# Patient Record
Sex: FEMALE | Race: ASIAN | NOT HISPANIC OR LATINO | URBAN - METROPOLITAN AREA
[De-identification: names, ages, dates, MRNs, and addresses within clinical notes are randomized per-mention and may not be internally consistent; named-entity substitution may affect disease eponyms.]

---

## 2020-12-01 ENCOUNTER — INPATIENT (INPATIENT)
Facility: HOSPITAL | Age: 85
LOS: 2 days | Discharge: HOME | DRG: 871 | End: 2020-12-04
Attending: INTERNAL MEDICINE | Admitting: INTERNAL MEDICINE
Payer: MEDICAID

## 2020-12-01 VITALS — RESPIRATION RATE: 33 BRPM | OXYGEN SATURATION: 97 % | HEART RATE: 115 BPM

## 2020-12-01 DIAGNOSIS — J18.9 PNEUMONIA, UNSPECIFIED ORGANISM: ICD-10-CM

## 2020-12-01 LAB
ALBUMIN SERPL ELPH-MCNC: 2.3 G/DL — LOW (ref 3.5–5)
ALP SERPL-CCNC: 123 U/L — HIGH (ref 40–120)
ALT FLD-CCNC: 35 U/L DA — SIGNIFICANT CHANGE UP (ref 10–60)
ANION GAP SERPL CALC-SCNC: 5 MMOL/L — SIGNIFICANT CHANGE UP (ref 5–17)
APPEARANCE UR: CLEAR — SIGNIFICANT CHANGE UP
APTT BLD: 33.8 SEC — SIGNIFICANT CHANGE UP (ref 27.5–35.5)
AST SERPL-CCNC: 42 U/L — HIGH (ref 10–40)
BASE EXCESS BLDV CALC-SCNC: 1 MMOL/L — SIGNIFICANT CHANGE UP (ref -2–2)
BASOPHILS # BLD AUTO: 0 K/UL — SIGNIFICANT CHANGE UP (ref 0–0.2)
BASOPHILS NFR BLD AUTO: 0 % — SIGNIFICANT CHANGE UP (ref 0–2)
BILIRUB SERPL-MCNC: 0.6 MG/DL — SIGNIFICANT CHANGE UP (ref 0.2–1.2)
BILIRUB UR-MCNC: NEGATIVE — SIGNIFICANT CHANGE UP
BUN SERPL-MCNC: 21 MG/DL — HIGH (ref 7–18)
CALCIUM SERPL-MCNC: 8.4 MG/DL — SIGNIFICANT CHANGE UP (ref 8.4–10.5)
CHLORIDE SERPL-SCNC: 106 MMOL/L — SIGNIFICANT CHANGE UP (ref 96–108)
CO2 SERPL-SCNC: 26 MMOL/L — SIGNIFICANT CHANGE UP (ref 22–31)
COLOR SPEC: YELLOW — SIGNIFICANT CHANGE UP
CREAT SERPL-MCNC: 1.47 MG/DL — HIGH (ref 0.5–1.3)
D DIMER BLD IA.RAPID-MCNC: 1439 NG/ML DDU — HIGH
DIFF PNL FLD: ABNORMAL
EOSINOPHIL # BLD AUTO: 0 K/UL — SIGNIFICANT CHANGE UP (ref 0–0.5)
EOSINOPHIL NFR BLD AUTO: 0 % — SIGNIFICANT CHANGE UP (ref 0–6)
FIBRINOGEN PPP-MCNC: 1152 MG/DL — HIGH (ref 290–520)
GLUCOSE SERPL-MCNC: 303 MG/DL — HIGH (ref 70–99)
GLUCOSE UR QL: 1000 MG/DL
HCO3 BLDV-SCNC: 26 MMOL/L — SIGNIFICANT CHANGE UP (ref 21–29)
HCT VFR BLD CALC: 33.9 % — LOW (ref 34.5–45)
HGB BLD-MCNC: 11 G/DL — LOW (ref 11.5–15.5)
HOROWITZ INDEX BLDV+IHG-RTO: 21 — SIGNIFICANT CHANGE UP
INR BLD: 1.04 RATIO — SIGNIFICANT CHANGE UP (ref 0.88–1.16)
KETONES UR-MCNC: NEGATIVE — SIGNIFICANT CHANGE UP
LACTATE SERPL-SCNC: 2.3 MMOL/L — HIGH (ref 0.7–2)
LACTATE SERPL-SCNC: 2.5 MMOL/L — HIGH (ref 0.7–2)
LDH SERPL L TO P-CCNC: 195 U/L — SIGNIFICANT CHANGE UP (ref 120–225)
LEUKOCYTE ESTERASE UR-ACNC: ABNORMAL
LYMPHOCYTES # BLD AUTO: 0.54 K/UL — LOW (ref 1–3.3)
LYMPHOCYTES # BLD AUTO: 4 % — LOW (ref 13–44)
MAGNESIUM SERPL-MCNC: 2.3 MG/DL — SIGNIFICANT CHANGE UP (ref 1.6–2.6)
MCHC RBC-ENTMCNC: 30.5 PG — SIGNIFICANT CHANGE UP (ref 27–34)
MCHC RBC-ENTMCNC: 32.4 GM/DL — SIGNIFICANT CHANGE UP (ref 32–36)
MCV RBC AUTO: 93.9 FL — SIGNIFICANT CHANGE UP (ref 80–100)
MONOCYTES # BLD AUTO: 0.81 K/UL — SIGNIFICANT CHANGE UP (ref 0–0.9)
MONOCYTES NFR BLD AUTO: 6 % — SIGNIFICANT CHANGE UP (ref 2–14)
NEUTROPHILS # BLD AUTO: 12.11 K/UL — HIGH (ref 1.8–7.4)
NEUTROPHILS NFR BLD AUTO: 85 % — HIGH (ref 43–77)
NITRITE UR-MCNC: NEGATIVE — SIGNIFICANT CHANGE UP
NT-PROBNP SERPL-SCNC: HIGH PG/ML (ref 0–450)
PCO2 BLDV: 46 MMHG — SIGNIFICANT CHANGE UP (ref 35–50)
PH BLDV: 7.38 — SIGNIFICANT CHANGE UP (ref 7.35–7.45)
PH UR: 8 — SIGNIFICANT CHANGE UP (ref 5–8)
PLATELET # BLD AUTO: 150 K/UL — SIGNIFICANT CHANGE UP (ref 150–400)
PO2 BLDV: 44 MMHG — SIGNIFICANT CHANGE UP (ref 25–45)
POTASSIUM SERPL-MCNC: 3.9 MMOL/L — SIGNIFICANT CHANGE UP (ref 3.5–5.3)
POTASSIUM SERPL-SCNC: 3.9 MMOL/L — SIGNIFICANT CHANGE UP (ref 3.5–5.3)
PROT SERPL-MCNC: 6.9 G/DL — SIGNIFICANT CHANGE UP (ref 6–8.3)
PROT UR-MCNC: 30 MG/DL
PROTHROM AB SERPL-ACNC: 12.4 SEC — SIGNIFICANT CHANGE UP (ref 10.6–13.6)
RAPID RVP RESULT: SIGNIFICANT CHANGE UP
RBC # BLD: 3.61 M/UL — LOW (ref 3.8–5.2)
RBC # FLD: 13.2 % — SIGNIFICANT CHANGE UP (ref 10.3–14.5)
SAO2 % BLDV: 80 % — SIGNIFICANT CHANGE UP (ref 67–88)
SARS-COV-2 RNA SPEC QL NAA+PROBE: SIGNIFICANT CHANGE UP
SODIUM SERPL-SCNC: 137 MMOL/L — SIGNIFICANT CHANGE UP (ref 135–145)
SP GR SPEC: 1.01 — SIGNIFICANT CHANGE UP (ref 1.01–1.02)
TROPONIN I SERPL-MCNC: 0.08 NG/ML — HIGH (ref 0–0.04)
UROBILINOGEN FLD QL: NEGATIVE — SIGNIFICANT CHANGE UP
WBC # BLD: 13.45 K/UL — HIGH (ref 3.8–10.5)
WBC # FLD AUTO: 13.45 K/UL — HIGH (ref 3.8–10.5)

## 2020-12-01 PROCEDURE — 71045 X-RAY EXAM CHEST 1 VIEW: CPT | Mod: 26

## 2020-12-01 PROCEDURE — 99291 CRITICAL CARE FIRST HOUR: CPT

## 2020-12-01 PROCEDURE — 71275 CT ANGIOGRAPHY CHEST: CPT | Mod: 26

## 2020-12-01 RX ORDER — IPRATROPIUM/ALBUTEROL SULFATE 18-103MCG
3 AEROSOL WITH ADAPTER (GRAM) INHALATION
Refills: 0 | Status: COMPLETED | OUTPATIENT
Start: 2020-12-01 | End: 2020-12-01

## 2020-12-01 RX ORDER — MAGNESIUM SULFATE 500 MG/ML
2 VIAL (ML) INJECTION ONCE
Refills: 0 | Status: COMPLETED | OUTPATIENT
Start: 2020-12-01 | End: 2020-12-01

## 2020-12-01 RX ORDER — AZITHROMYCIN 500 MG/1
500 TABLET, FILM COATED ORAL ONCE
Refills: 0 | Status: COMPLETED | OUTPATIENT
Start: 2020-12-01 | End: 2020-12-01

## 2020-12-01 RX ORDER — ASPIRIN/CALCIUM CARB/MAGNESIUM 324 MG
325 TABLET ORAL ONCE
Refills: 0 | Status: COMPLETED | OUTPATIENT
Start: 2020-12-01 | End: 2020-12-01

## 2020-12-01 RX ORDER — CEFTRIAXONE 500 MG/1
1000 INJECTION, POWDER, FOR SOLUTION INTRAMUSCULAR; INTRAVENOUS ONCE
Refills: 0 | Status: COMPLETED | OUTPATIENT
Start: 2020-12-01 | End: 2020-12-01

## 2020-12-01 RX ORDER — SODIUM CHLORIDE 9 MG/ML
1000 INJECTION INTRAMUSCULAR; INTRAVENOUS; SUBCUTANEOUS ONCE
Refills: 0 | Status: COMPLETED | OUTPATIENT
Start: 2020-12-01 | End: 2020-12-01

## 2020-12-01 RX ORDER — DEXAMETHASONE 0.5 MG/5ML
6 ELIXIR ORAL ONCE
Refills: 0 | Status: COMPLETED | OUTPATIENT
Start: 2020-12-01 | End: 2020-12-01

## 2020-12-01 RX ADMIN — Medication 3 MILLILITER(S): at 18:15

## 2020-12-01 RX ADMIN — Medication 3 MILLILITER(S): at 18:23

## 2020-12-01 RX ADMIN — Medication 325 MILLIGRAM(S): at 19:26

## 2020-12-01 RX ADMIN — Medication 6 MILLIGRAM(S): at 18:24

## 2020-12-01 RX ADMIN — AZITHROMYCIN 255 MILLIGRAM(S): 500 TABLET, FILM COATED ORAL at 18:22

## 2020-12-01 RX ADMIN — CEFTRIAXONE 100 MILLIGRAM(S): 500 INJECTION, POWDER, FOR SOLUTION INTRAMUSCULAR; INTRAVENOUS at 18:22

## 2020-12-01 RX ADMIN — Medication 3 MILLILITER(S): at 18:25

## 2020-12-01 RX ADMIN — SODIUM CHLORIDE 1000 MILLILITER(S): 9 INJECTION INTRAMUSCULAR; INTRAVENOUS; SUBCUTANEOUS at 19:27

## 2020-12-01 RX ADMIN — SODIUM CHLORIDE 1000 MILLILITER(S): 9 INJECTION INTRAMUSCULAR; INTRAVENOUS; SUBCUTANEOUS at 18:22

## 2020-12-01 RX ADMIN — Medication 50 GRAM(S): at 19:26

## 2020-12-01 NOTE — ED PROVIDER NOTE - CRITICAL CARE PROVIDED
interpretation of diagnostic studies/additional history taking/direct patient care (not related to procedure)/consult w/ pt's family directly relating to pts condition/documentation

## 2020-12-01 NOTE — ED ADULT NURSE NOTE - OBJECTIVE STATEMENT
pt is here for cough , sob and fever , as per granddaughter pt also has high blood sugar , placed on CM

## 2020-12-01 NOTE — ED ADULT NURSE NOTE - NSIMPLEMENTINTERV_GEN_ALL_ED
See Device Report
Implemented All Fall with Harm Risk Interventions:  [place] to call system. Call bell, personal items and telephone within reach. Instruct patient to call for assistance. Room bathroom lighting operational. Non-slip footwear when patient is off stretcher. Physically safe environment: no spills, clutter or unnecessary equipment. Stretcher in lowest position, wheels locked, appropriate side rails in place. Provide visual cue, wrist band, yellow gown, etc. Monitor gait and stability. Monitor for mental status changes and reorient to person, place, and time. Review medications for side effects contributing to fall risk. Reinforce activity limits and safety measures with patient and family. Provide visual clues: red socks.

## 2020-12-01 NOTE — ED PROVIDER NOTE - PHYSICAL EXAMINATION
Afebrile, hemodynamically stable, saturating well on RA  Tachypneic though trying to get out of bed to urinate  Head NCAT  EOMI grossly, anicteric  JVD to mid-neck  Tachycardic, nml S1/S2, no m/r/g  Lungs diffuse wheezing and rhonchi  Abd soft, NT, ND, nml BS, no rebound or guarding  Confused, follows only very basic commands, CN's 3-12 grossly intact  KEE spontaneously, no leg cyanosis or edema  Skin cool, dry

## 2020-12-01 NOTE — ED PROVIDER NOTE - OBJECTIVE STATEMENT
Granddaughter at bedside translates [phone].  84yoF with h/o DM, HTN, HLD, on metformin, metop, amlodipine, statin, ASA, presents with cough, fever, chills, body aches x 5 days. Associated very poor appetite and being to hallucinate. Has not been tested for COVID. Denies vomiting and all other symptoms. Pt herself confused and unable to provide hx.

## 2020-12-01 NOTE — ED PROVIDER NOTE - CLINICAL SUMMARY MEDICAL DECISION MAKING FREE TEXT BOX
No meningeal signs, e/o endocarditis, cellulitis, intra-abdominal process. CXR _____ UA _____. Character low suspicion for CVA and no focal or localizing findings. No meningeal signs, e/o endocarditis, cellulitis, intra-abdominal process. UA negative for UTI. CXR concern for PNA. Character low suspicion for CVA and no focal or localizing findings. Character low suspicion for PE and no e/o DVT and cough/fever more c/w respiratory infection; D-dimer sent purely as part of COVID w/u. Given Duonebs, decadron, magnesium, fluids with significant improvement from arrival. No meningeal signs, e/o endocarditis, cellulitis, intra-abdominal process. UA negative for UTI. CXR and CT concerning for PNA. Character low suspicion for CVA and no focal or localizing findings. Character low suspicion for PE and no e/o DVT and cough/fever more c/w respiratory infection; and CTA negative. Given Duonebs, decadron, magnesium, fluids with significant improvement from arrival. Satting well on 5L NC O2. Patient nontoxic appearing, hemodynamically stable. Given fluids, abx. Admitted to internal medicine for further monitoring, w/u, and care.

## 2020-12-01 NOTE — ED PROVIDER NOTE - CARE PLAN
Principal Discharge DX:	Pneumonia  Secondary Diagnosis:	Severe sepsis  Secondary Diagnosis:	SILVANO (acute kidney injury)  Secondary Diagnosis:	Acute respiratory failure with hypoxia   Principal Discharge DX:	Pneumonia  Secondary Diagnosis:	Severe sepsis  Secondary Diagnosis:	SILVANO (acute kidney injury)  Secondary Diagnosis:	Acute respiratory failure with hypoxia  Secondary Diagnosis:	Asthma exacerbation   Principal Discharge DX:	Pneumonia  Secondary Diagnosis:	Severe sepsis  Secondary Diagnosis:	SILVANO (acute kidney injury)  Secondary Diagnosis:	Acute respiratory failure with hypoxia  Secondary Diagnosis:	Asthma exacerbation  Secondary Diagnosis:	Elevated troponin  Secondary Diagnosis:	Elevated brain natriuretic peptide (BNP) level

## 2020-12-01 NOTE — ED PROVIDER NOTE - SECONDARY DIAGNOSIS.
Severe sepsis SILVANO (acute kidney injury) Acute respiratory failure with hypoxia Asthma exacerbation Elevated troponin Elevated brain natriuretic peptide (BNP) level

## 2020-12-02 DIAGNOSIS — I10 ESSENTIAL (PRIMARY) HYPERTENSION: ICD-10-CM

## 2020-12-02 DIAGNOSIS — Z29.9 ENCOUNTER FOR PROPHYLACTIC MEASURES, UNSPECIFIED: ICD-10-CM

## 2020-12-02 DIAGNOSIS — N17.9 ACUTE KIDNEY FAILURE, UNSPECIFIED: ICD-10-CM

## 2020-12-02 DIAGNOSIS — A41.9 SEPSIS, UNSPECIFIED ORGANISM: ICD-10-CM

## 2020-12-02 DIAGNOSIS — J18.9 PNEUMONIA, UNSPECIFIED ORGANISM: ICD-10-CM

## 2020-12-02 DIAGNOSIS — D64.9 ANEMIA, UNSPECIFIED: ICD-10-CM

## 2020-12-02 DIAGNOSIS — J45.909 UNSPECIFIED ASTHMA, UNCOMPLICATED: ICD-10-CM

## 2020-12-02 DIAGNOSIS — E78.5 HYPERLIPIDEMIA, UNSPECIFIED: ICD-10-CM

## 2020-12-02 DIAGNOSIS — E11.9 TYPE 2 DIABETES MELLITUS WITHOUT COMPLICATIONS: ICD-10-CM

## 2020-12-02 DIAGNOSIS — Z71.89 OTHER SPECIFIED COUNSELING: ICD-10-CM

## 2020-12-02 LAB
24R-OH-CALCIDIOL SERPL-MCNC: 57 NG/ML — SIGNIFICANT CHANGE UP (ref 30–80)
A1C WITH ESTIMATED AVERAGE GLUCOSE RESULT: 6.4 % — HIGH (ref 4–5.6)
ALBUMIN SERPL ELPH-MCNC: 2.5 G/DL — LOW (ref 3.5–5)
ALP SERPL-CCNC: 126 U/L — HIGH (ref 40–120)
ALT FLD-CCNC: 42 U/L DA — SIGNIFICANT CHANGE UP (ref 10–60)
ANION GAP SERPL CALC-SCNC: 7 MMOL/L — SIGNIFICANT CHANGE UP (ref 5–17)
AST SERPL-CCNC: 39 U/L — SIGNIFICANT CHANGE UP (ref 10–40)
BASOPHILS # BLD AUTO: 0.02 K/UL — SIGNIFICANT CHANGE UP (ref 0–0.2)
BASOPHILS NFR BLD AUTO: 0.1 % — SIGNIFICANT CHANGE UP (ref 0–2)
BILIRUB SERPL-MCNC: 0.4 MG/DL — SIGNIFICANT CHANGE UP (ref 0.2–1.2)
BUN SERPL-MCNC: 16 MG/DL — SIGNIFICANT CHANGE UP (ref 7–18)
CALCIUM SERPL-MCNC: 8.6 MG/DL — SIGNIFICANT CHANGE UP (ref 8.4–10.5)
CHLORIDE SERPL-SCNC: 112 MMOL/L — HIGH (ref 96–108)
CHOLEST SERPL-MCNC: 93 MG/DL — SIGNIFICANT CHANGE UP
CO2 SERPL-SCNC: 24 MMOL/L — SIGNIFICANT CHANGE UP (ref 22–31)
CREAT SERPL-MCNC: 1.13 MG/DL — SIGNIFICANT CHANGE UP (ref 0.5–1.3)
CRP SERPL-MCNC: 27.16 MG/DL — HIGH (ref 0–0.4)
CULTURE RESULTS: SIGNIFICANT CHANGE UP
EOSINOPHIL # BLD AUTO: 0.03 K/UL — SIGNIFICANT CHANGE UP (ref 0–0.5)
EOSINOPHIL NFR BLD AUTO: 0.2 % — SIGNIFICANT CHANGE UP (ref 0–6)
ERYTHROCYTE [SEDIMENTATION RATE] IN BLOOD: 90 MM/HR — HIGH (ref 0–20)
ESTIMATED AVERAGE GLUCOSE: 137 MG/DL — HIGH (ref 68–114)
FERRITIN SERPL-MCNC: 228 NG/ML — HIGH (ref 15–150)
FERRITIN SERPL-MCNC: 268 NG/ML — HIGH (ref 15–150)
FOLATE SERPL-MCNC: >20 NG/ML — SIGNIFICANT CHANGE UP
GLUCOSE BLDC GLUCOMTR-MCNC: 141 MG/DL — HIGH (ref 70–99)
GLUCOSE BLDC GLUCOMTR-MCNC: 151 MG/DL — HIGH (ref 70–99)
GLUCOSE BLDC GLUCOMTR-MCNC: 272 MG/DL — HIGH (ref 70–99)
GLUCOSE BLDC GLUCOMTR-MCNC: 288 MG/DL — HIGH (ref 70–99)
GLUCOSE BLDC GLUCOMTR-MCNC: 348 MG/DL — HIGH (ref 70–99)
GLUCOSE SERPL-MCNC: 311 MG/DL — HIGH (ref 70–99)
HCT VFR BLD CALC: 35.8 % — SIGNIFICANT CHANGE UP (ref 34.5–45)
HDLC SERPL-MCNC: 26 MG/DL — LOW
HGB BLD-MCNC: 12 G/DL — SIGNIFICANT CHANGE UP (ref 11.5–15.5)
IMM GRANULOCYTES NFR BLD AUTO: 0.5 % — SIGNIFICANT CHANGE UP (ref 0–1.5)
IRON SATN MFR SERPL: 10 % — LOW (ref 15–50)
IRON SATN MFR SERPL: 26 UG/DL — LOW (ref 40–150)
LACTATE SERPL-SCNC: 1.5 MMOL/L — SIGNIFICANT CHANGE UP (ref 0.7–2)
LIPID PNL WITH DIRECT LDL SERPL: 50 MG/DL — SIGNIFICANT CHANGE UP
LYMPHOCYTES # BLD AUTO: 0.29 K/UL — LOW (ref 1–3.3)
LYMPHOCYTES # BLD AUTO: 2 % — LOW (ref 13–44)
MAGNESIUM SERPL-MCNC: 2.7 MG/DL — HIGH (ref 1.6–2.6)
MCHC RBC-ENTMCNC: 31.3 PG — SIGNIFICANT CHANGE UP (ref 27–34)
MCHC RBC-ENTMCNC: 33.5 GM/DL — SIGNIFICANT CHANGE UP (ref 32–36)
MCV RBC AUTO: 93.2 FL — SIGNIFICANT CHANGE UP (ref 80–100)
MONOCYTES # BLD AUTO: 0.35 K/UL — SIGNIFICANT CHANGE UP (ref 0–0.9)
MONOCYTES NFR BLD AUTO: 2.5 % — SIGNIFICANT CHANGE UP (ref 2–14)
NEUTROPHILS # BLD AUTO: 13.51 K/UL — HIGH (ref 1.8–7.4)
NEUTROPHILS NFR BLD AUTO: 94.7 % — HIGH (ref 43–77)
NON HDL CHOLESTEROL: 67 MG/DL — SIGNIFICANT CHANGE UP
NRBC # BLD: 0 /100 WBCS — SIGNIFICANT CHANGE UP (ref 0–0)
PHOSPHATE SERPL-MCNC: 1.1 MG/DL — LOW (ref 2.5–4.5)
PLATELET # BLD AUTO: 174 K/UL — SIGNIFICANT CHANGE UP (ref 150–400)
POTASSIUM SERPL-MCNC: 3.5 MMOL/L — SIGNIFICANT CHANGE UP (ref 3.5–5.3)
POTASSIUM SERPL-SCNC: 3.5 MMOL/L — SIGNIFICANT CHANGE UP (ref 3.5–5.3)
PROCALCITONIN SERPL-MCNC: 7.37 NG/ML — HIGH (ref 0.02–0.1)
PROT SERPL-MCNC: 7.8 G/DL — SIGNIFICANT CHANGE UP (ref 6–8.3)
RBC # BLD: 3.84 M/UL — SIGNIFICANT CHANGE UP (ref 3.8–5.2)
RBC # FLD: 13.8 % — SIGNIFICANT CHANGE UP (ref 10.3–14.5)
SARS-COV-2 IGG SERPL QL IA: NEGATIVE — SIGNIFICANT CHANGE UP
SARS-COV-2 IGM SERPL IA-ACNC: <0.1 INDEX — SIGNIFICANT CHANGE UP
SARS-COV-2 RNA SPEC QL NAA+PROBE: SIGNIFICANT CHANGE UP
SODIUM SERPL-SCNC: 143 MMOL/L — SIGNIFICANT CHANGE UP (ref 135–145)
SPECIMEN SOURCE: SIGNIFICANT CHANGE UP
TIBC SERPL-MCNC: 249 UG/DL — LOW (ref 250–450)
TRIGL SERPL-MCNC: 87 MG/DL — SIGNIFICANT CHANGE UP
TROPONIN I SERPL-MCNC: 0.06 NG/ML — HIGH (ref 0–0.04)
TSH SERPL-MCNC: 0.19 UU/ML — LOW (ref 0.34–4.82)
UIBC SERPL-MCNC: 223 UG/DL — SIGNIFICANT CHANGE UP (ref 110–370)
VIT B12 SERPL-MCNC: >2000 PG/ML — HIGH (ref 232–1245)
WBC # BLD: 14.27 K/UL — HIGH (ref 3.8–10.5)
WBC # FLD AUTO: 14.27 K/UL — HIGH (ref 3.8–10.5)

## 2020-12-02 RX ORDER — INSULIN LISPRO 100/ML
VIAL (ML) SUBCUTANEOUS
Refills: 0 | Status: DISCONTINUED | OUTPATIENT
Start: 2020-12-02 | End: 2020-12-04

## 2020-12-02 RX ORDER — POTASSIUM PHOSPHATE, MONOBASIC POTASSIUM PHOSPHATE, DIBASIC 236; 224 MG/ML; MG/ML
30 INJECTION, SOLUTION INTRAVENOUS ONCE
Refills: 0 | Status: COMPLETED | OUTPATIENT
Start: 2020-12-02 | End: 2020-12-02

## 2020-12-02 RX ORDER — ONDANSETRON 8 MG/1
4 TABLET, FILM COATED ORAL EVERY 6 HOURS
Refills: 0 | Status: DISCONTINUED | OUTPATIENT
Start: 2020-12-02 | End: 2020-12-04

## 2020-12-02 RX ORDER — ASPIRIN/CALCIUM CARB/MAGNESIUM 324 MG
81 TABLET ORAL DAILY
Refills: 0 | Status: DISCONTINUED | OUTPATIENT
Start: 2020-12-02 | End: 2020-12-04

## 2020-12-02 RX ORDER — ATORVASTATIN CALCIUM 80 MG/1
40 TABLET, FILM COATED ORAL AT BEDTIME
Refills: 0 | Status: DISCONTINUED | OUTPATIENT
Start: 2020-12-02 | End: 2020-12-04

## 2020-12-02 RX ORDER — SODIUM CHLORIDE 9 MG/ML
1000 INJECTION INTRAMUSCULAR; INTRAVENOUS; SUBCUTANEOUS
Refills: 0 | Status: DISCONTINUED | OUTPATIENT
Start: 2020-12-02 | End: 2020-12-04

## 2020-12-02 RX ORDER — HEPARIN SODIUM 5000 [USP'U]/ML
5000 INJECTION INTRAVENOUS; SUBCUTANEOUS EVERY 8 HOURS
Refills: 0 | Status: DISCONTINUED | OUTPATIENT
Start: 2020-12-02 | End: 2020-12-04

## 2020-12-02 RX ORDER — ACETAMINOPHEN 500 MG
650 TABLET ORAL EVERY 6 HOURS
Refills: 0 | Status: DISCONTINUED | OUTPATIENT
Start: 2020-12-02 | End: 2020-12-04

## 2020-12-02 RX ORDER — CEFTRIAXONE 500 MG/1
1000 INJECTION, POWDER, FOR SOLUTION INTRAMUSCULAR; INTRAVENOUS EVERY 24 HOURS
Refills: 0 | Status: DISCONTINUED | OUTPATIENT
Start: 2020-12-02 | End: 2020-12-04

## 2020-12-02 RX ORDER — ALBUTEROL 90 UG/1
2 AEROSOL, METERED ORAL EVERY 6 HOURS
Refills: 0 | Status: DISCONTINUED | OUTPATIENT
Start: 2020-12-02 | End: 2020-12-04

## 2020-12-02 RX ORDER — INSULIN GLARGINE 100 [IU]/ML
5 INJECTION, SOLUTION SUBCUTANEOUS EVERY MORNING
Refills: 0 | Status: DISCONTINUED | OUTPATIENT
Start: 2020-12-02 | End: 2020-12-04

## 2020-12-02 RX ORDER — METOPROLOL TARTRATE 50 MG
25 TABLET ORAL
Refills: 0 | Status: DISCONTINUED | OUTPATIENT
Start: 2020-12-02 | End: 2020-12-04

## 2020-12-02 RX ORDER — AZITHROMYCIN 500 MG/1
500 TABLET, FILM COATED ORAL EVERY 24 HOURS
Refills: 0 | Status: DISCONTINUED | OUTPATIENT
Start: 2020-12-02 | End: 2020-12-04

## 2020-12-02 RX ADMIN — Medication 25 MILLIGRAM(S): at 01:49

## 2020-12-02 RX ADMIN — ATORVASTATIN CALCIUM 40 MILLIGRAM(S): 80 TABLET, FILM COATED ORAL at 23:31

## 2020-12-02 RX ADMIN — Medication 25 MILLIGRAM(S): at 19:38

## 2020-12-02 RX ADMIN — HEPARIN SODIUM 5000 UNIT(S): 5000 INJECTION INTRAVENOUS; SUBCUTANEOUS at 05:45

## 2020-12-02 RX ADMIN — Medication 3: at 08:13

## 2020-12-02 RX ADMIN — HEPARIN SODIUM 5000 UNIT(S): 5000 INJECTION INTRAVENOUS; SUBCUTANEOUS at 14:07

## 2020-12-02 RX ADMIN — Medication 81 MILLIGRAM(S): at 11:18

## 2020-12-02 RX ADMIN — AZITHROMYCIN 255 MILLIGRAM(S): 500 TABLET, FILM COATED ORAL at 09:23

## 2020-12-02 RX ADMIN — HEPARIN SODIUM 5000 UNIT(S): 5000 INJECTION INTRAVENOUS; SUBCUTANEOUS at 23:31

## 2020-12-02 RX ADMIN — INSULIN GLARGINE 5 UNIT(S): 100 INJECTION, SOLUTION SUBCUTANEOUS at 09:24

## 2020-12-02 RX ADMIN — CEFTRIAXONE 100 MILLIGRAM(S): 500 INJECTION, POWDER, FOR SOLUTION INTRAMUSCULAR; INTRAVENOUS at 08:14

## 2020-12-02 RX ADMIN — Medication 25 MILLIGRAM(S): at 05:46

## 2020-12-02 RX ADMIN — SODIUM CHLORIDE 60 MILLILITER(S): 9 INJECTION INTRAMUSCULAR; INTRAVENOUS; SUBCUTANEOUS at 06:39

## 2020-12-02 RX ADMIN — Medication 1: at 19:13

## 2020-12-02 RX ADMIN — Medication 3: at 11:18

## 2020-12-02 RX ADMIN — POTASSIUM PHOSPHATE, MONOBASIC POTASSIUM PHOSPHATE, DIBASIC 62.5 MILLIMOLE(S): 236; 224 INJECTION, SOLUTION INTRAVENOUS at 09:24

## 2020-12-02 NOTE — H&P ADULT - ASSESSMENT
Patient is an 84 year old female from home, with PMH of DM, HTN, asthma, and HLD, who presented to the ED due to fever, chills and cough.     Leukocytosis of 13.45. Hgb of 11.0. Creatinine of 1.47. D-dimer of 1439. Troponin of 0.078. UA negative. COVID negative. CXR showing interstitial prominence. CTA chest negative for PE. Showing RML PNA. EKG showing sinus tachycardia. Given duonebs, dose of decadron, magnesium sulfate, aspirin, ceftriaxone and azithromycin in ED. Given 2L NS bolus.     Admitted for PNA. Patient is an 84 year old female from home, with PMH of DM, HTN, asthma, and HLD, who presented to the ED due to fever, chills and cough.     Leukocytosis of 13.45. Hgb of 11.0. Creatinine of 1.47. D-dimer of 1439. Troponin of 0.078. UA negative. COVID negative. CXR showing interstitial prominence. CTA chest negative for PE. Showing RML PNA. EKG showing sinus tachycardia. Given duonebs, dose of decadron, magnesium sulfate, aspirin, ceftriaxone and azithromycin in ED. Given 2L NS bolus.     Admitted for PNA.       Granddaughter unsure of home medications. Pharmacy currently closed. Primary team to confirm meds in AM.

## 2020-12-02 NOTE — H&P ADULT - PROBLEM SELECTOR PLAN 1
patient presented to the ED due to fever and chills at home  noted to be tachycardic in the 120s with leukocytosis of patient presented to the ED due to fever and chills at home  noted to be tachycardic in the 120s with leukocytosis of 13.45K  given dose of CTX and zithro in ED  given NS bolus x2  CTA chest showing RML PNA  continue IV abx   f/u blood and urine cx   f/u lactate in AM  gentle IVF

## 2020-12-02 NOTE — H&P ADULT - PROBLEM SELECTOR PLAN 4
noted to have Hgb of 11.0 on admission  unknown baseline  no signs of bleeding noted  f/u anemia panel  monitor CBC

## 2020-12-02 NOTE — H&P ADULT - ATTENDING COMMENTS
patient was seen and examined along with resident earlier   above discussed , reviewed and agreed   plus pulmonary and cardiology eval

## 2020-12-02 NOTE — H&P ADULT - PROBLEM SELECTOR PLAN 6
primary team to confirm home meds   will hold BP meds for now in setting of sepsis and normal BP  monitor BP and add meds as needed

## 2020-12-02 NOTE — H&P ADULT - NSICDXPASTMEDICALHX_GEN_ALL_CORE_FT
PAST MEDICAL HISTORY:  Asthma     Diabetes mellitus     HLD (hyperlipidemia)     HTN (hypertension)

## 2020-12-02 NOTE — H&P ADULT - HISTORY OF PRESENT ILLNESS
Patient is an 84 year old female from home, with PMH of DM, HTN, asthma, and HLD, who presented to the ED due to fever, chills and cough. Patient is a poor historian. Patient states that she came to the hospital due to feeling weak. Further history obtained from granddaughter, Traci Kolb ([phone]). As per granddaughter, patient had taken a shower on Friday and had taken longer than usual in the shower and also did not dry her hair after some time and was noted to feel warm later that night. Temperature was taken and noted to be 100.3 and patient also started to develop a cough and chills. Patient also has been having decreased appetite. Patient currently denies any chest pain or shortness of breath. PATIENT NEEDS ASSISTANCE TO LEAVE RESIDENCE:

## 2020-12-02 NOTE — H&P ADULT - PROBLEM SELECTOR PLAN 8
primary team to confirm home meds  start SSI  f/u A1c  monitor blood sugars and adjust insulin as needed

## 2020-12-02 NOTE — H&P ADULT - PROBLEM SELECTOR PLAN 2
RML PNA noted on CT chest   given dose of CTX and zithro in ED  continue IV abx  monitor O2 sats  oxygen supplementation; taper off as tolerated   f/u blood cx RML PNA noted on CT chest   given dose of CTX and zithro in ED  continue IV abx  pulm Dr. Lindsey consulted  monitor O2 sats  oxygen supplementation; taper off as tolerated   f/u blood cx

## 2020-12-02 NOTE — CONSULT NOTE ADULT - ASSESSMENT
Asthmatic Bronchitis/ Pneumonia RUL and RML   IDDM  Htn   COVID -- Negative       Plan- PCR- Covid  Isolation till test negative   Blood c/s, urine legionella  IV rocephin and zithro   [id number] 2 puffs bid   Ventolin 2 puffs q4h prn   s/c Lovenox  Thanks for consult

## 2020-12-03 ENCOUNTER — TRANSCRIPTION ENCOUNTER (OUTPATIENT)
Age: 85
End: 2020-12-03

## 2020-12-03 LAB
ANION GAP SERPL CALC-SCNC: 10 MMOL/L — SIGNIFICANT CHANGE UP (ref 5–17)
BUN SERPL-MCNC: 15 MG/DL — SIGNIFICANT CHANGE UP (ref 7–18)
CALCIUM SERPL-MCNC: 8.9 MG/DL — SIGNIFICANT CHANGE UP (ref 8.4–10.5)
CHLORIDE SERPL-SCNC: 110 MMOL/L — HIGH (ref 96–108)
CO2 SERPL-SCNC: 25 MMOL/L — SIGNIFICANT CHANGE UP (ref 22–31)
CREAT SERPL-MCNC: 0.82 MG/DL — SIGNIFICANT CHANGE UP (ref 0.5–1.3)
GLUCOSE BLDC GLUCOMTR-MCNC: 100 MG/DL — HIGH (ref 70–99)
GLUCOSE BLDC GLUCOMTR-MCNC: 116 MG/DL — HIGH (ref 70–99)
GLUCOSE BLDC GLUCOMTR-MCNC: 143 MG/DL — HIGH (ref 70–99)
GLUCOSE BLDC GLUCOMTR-MCNC: 151 MG/DL — HIGH (ref 70–99)
GLUCOSE SERPL-MCNC: 153 MG/DL — HIGH (ref 70–99)
HCT VFR BLD CALC: 37.3 % — SIGNIFICANT CHANGE UP (ref 34.5–45)
HGB BLD-MCNC: 12.5 G/DL — SIGNIFICANT CHANGE UP (ref 11.5–15.5)
MAGNESIUM SERPL-MCNC: 2.4 MG/DL — SIGNIFICANT CHANGE UP (ref 1.6–2.6)
MCHC RBC-ENTMCNC: 30.9 PG — SIGNIFICANT CHANGE UP (ref 27–34)
MCHC RBC-ENTMCNC: 33.5 GM/DL — SIGNIFICANT CHANGE UP (ref 32–36)
MCV RBC AUTO: 92.3 FL — SIGNIFICANT CHANGE UP (ref 80–100)
NRBC # BLD: 0 /100 WBCS — SIGNIFICANT CHANGE UP (ref 0–0)
PHOSPHATE SERPL-MCNC: 1.7 MG/DL — LOW (ref 2.5–4.5)
PLATELET # BLD AUTO: 232 K/UL — SIGNIFICANT CHANGE UP (ref 150–400)
POTASSIUM SERPL-MCNC: 3.2 MMOL/L — LOW (ref 3.5–5.3)
POTASSIUM SERPL-SCNC: 3.2 MMOL/L — LOW (ref 3.5–5.3)
RBC # BLD: 4.04 M/UL — SIGNIFICANT CHANGE UP (ref 3.8–5.2)
RBC # FLD: 13.8 % — SIGNIFICANT CHANGE UP (ref 10.3–14.5)
SODIUM SERPL-SCNC: 145 MMOL/L — SIGNIFICANT CHANGE UP (ref 135–145)
WBC # BLD: 12.11 K/UL — HIGH (ref 3.8–10.5)
WBC # FLD AUTO: 12.11 K/UL — HIGH (ref 3.8–10.5)

## 2020-12-03 PROCEDURE — 71045 X-RAY EXAM CHEST 1 VIEW: CPT | Mod: 26

## 2020-12-03 RX ORDER — POTASSIUM PHOSPHATE, MONOBASIC POTASSIUM PHOSPHATE, DIBASIC 236; 224 MG/ML; MG/ML
30 INJECTION, SOLUTION INTRAVENOUS ONCE
Refills: 0 | Status: COMPLETED | OUTPATIENT
Start: 2020-12-03 | End: 2020-12-03

## 2020-12-03 RX ORDER — POTASSIUM CHLORIDE 20 MEQ
40 PACKET (EA) ORAL EVERY 4 HOURS
Refills: 0 | Status: COMPLETED | OUTPATIENT
Start: 2020-12-03 | End: 2020-12-03

## 2020-12-03 RX ADMIN — POTASSIUM PHOSPHATE, MONOBASIC POTASSIUM PHOSPHATE, DIBASIC 62.5 MILLIMOLE(S): 236; 224 INJECTION, SOLUTION INTRAVENOUS at 11:59

## 2020-12-03 RX ADMIN — Medication 1: at 08:37

## 2020-12-03 RX ADMIN — Medication 81 MILLIGRAM(S): at 12:00

## 2020-12-03 RX ADMIN — HEPARIN SODIUM 5000 UNIT(S): 5000 INJECTION INTRAVENOUS; SUBCUTANEOUS at 22:15

## 2020-12-03 RX ADMIN — Medication 40 MILLIEQUIVALENT(S): at 14:46

## 2020-12-03 RX ADMIN — ATORVASTATIN CALCIUM 40 MILLIGRAM(S): 80 TABLET, FILM COATED ORAL at 22:15

## 2020-12-03 RX ADMIN — HEPARIN SODIUM 5000 UNIT(S): 5000 INJECTION INTRAVENOUS; SUBCUTANEOUS at 06:01

## 2020-12-03 RX ADMIN — Medication 25 MILLIGRAM(S): at 17:34

## 2020-12-03 RX ADMIN — Medication 200 MILLIGRAM(S): at 17:34

## 2020-12-03 RX ADMIN — HEPARIN SODIUM 5000 UNIT(S): 5000 INJECTION INTRAVENOUS; SUBCUTANEOUS at 14:46

## 2020-12-03 RX ADMIN — Medication 200 MILLIGRAM(S): at 06:01

## 2020-12-03 RX ADMIN — CEFTRIAXONE 100 MILLIGRAM(S): 500 INJECTION, POWDER, FOR SOLUTION INTRAMUSCULAR; INTRAVENOUS at 08:37

## 2020-12-03 RX ADMIN — Medication 25 MILLIGRAM(S): at 06:01

## 2020-12-03 RX ADMIN — Medication 200 MILLIGRAM(S): at 11:59

## 2020-12-03 RX ADMIN — ONDANSETRON 4 MILLIGRAM(S): 8 TABLET, FILM COATED ORAL at 15:13

## 2020-12-03 RX ADMIN — INSULIN GLARGINE 5 UNIT(S): 100 INJECTION, SOLUTION SUBCUTANEOUS at 08:37

## 2020-12-03 RX ADMIN — AZITHROMYCIN 255 MILLIGRAM(S): 500 TABLET, FILM COATED ORAL at 08:37

## 2020-12-03 RX ADMIN — Medication 40 MILLIEQUIVALENT(S): at 12:00

## 2020-12-03 NOTE — PROGRESS NOTE ADULT - PROBLEM SELECTOR PLAN 7
primary team updated med rec information obtained from pharmacy  start atorvastatin for now  f/u lipid profile
primary team to confirm home meds  start atorvastatin for now  f/u lipid profile

## 2020-12-03 NOTE — PROGRESS NOTE ADULT - PROBLEM SELECTOR PLAN 8
start SSI  f/u A1c  monitor blood sugars and adjust insulin as needed
primary team to confirm home meds  start SSI  f/u A1c  monitor blood sugars and adjust insulin as needed

## 2020-12-03 NOTE — PROGRESS NOTE ADULT - PROBLEM SELECTOR PLAN 1
present on admission    iv abx  as per   id   panculture
patient presented to the ED due to fever and chills at home  noted to be tachycardic in the 120s with leukocytosis of 13.45K  given dose of CTX and zithro in ED  given NS bolus x2  CTA chest showing RML PNA  continue IV abx   gentle IVF  ID consulted (Dr. Nunez)   recommended to obtain Nasal swab for MRSA PCR    obtain Legionella urine Ag test  Continue Ceftriaxone and Azithromycin until work up is done   Monitor WBC count  and QTc while on Azithromycin    Supplemental oxygenation and bronchodilator as needed
patient presented to the ED due to fever and chills at home  noted to be tachycardic in the 120s with leukocytosis of 13.45K  given dose of CTX and zithro in ED  given NS bolus x2  CTA chest showing RML PNA  continue IV abx   f/u blood and urine cx   f/u lactate in AM  gentle IVF

## 2020-12-03 NOTE — CONSULT NOTE ADULT - SUBJECTIVE AND OBJECTIVE BOX
Patient is a 84y old  Female from home, with PMH of DM, HTN, asthma, and HLD, who presented to the ER for evaluation of fever, chills and cough.  As per granddaughter, patient had taken a shower on Friday and had taken longer than usual in the shower and also did not dry her hair after some time and was noted to feel warm later that night. Temperature was taken and noted to be 100.3 and patient also started to develop a cough and chills. Patient also has been having decreased appetite. ON admission, she found to have tachycardia, tachypnea and Leukocytosis. The CT chest shows no PE but Right middle lobe pneumonia with possible mild involvement in the right upper lobe. She has started on Ceftriaxone and Azithromycin, and the ID consult requested to assist with further evaluation and antibiotic management.      REVIEW OF SYSTEMS: Total of twelve systems have been reviewed with patient and found to be negative unless mentioned in HPI      PAST MEDICAL & SURGICAL HISTORY:  Asthma  HLD (hyperlipidemia)  HTN (hypertension)  Diabetes mellitus      SOCIAL HISTORY  Alcohol: Does not drink  Tobacco: Does not smoke  Illicit substance use: None      FAMILY HISTORY: Non contributory to the present illness      ALLERGIES: No Known Allergies      vital Signs Last 24 Hrs  T(C): 36.4 (03 Dec 2020 08:13), Max: 37.2 (03 Dec 2020 05:28)  T(F): 97.5 (03 Dec 2020 08:13), Max: 98.9 (03 Dec 2020 05:28)  HR: 88 (03 Dec 2020 08:13) (73 - 98)  BP: 146/62 (03 Dec 2020 08:13) (106/66 - 161/75)  BP(mean): --  RR: 18 (03 Dec 2020 08:13) (17 - 22)  SpO2: 98% (03 Dec 2020 08:13) (94% - 99%)        PHYSICAL EXAM:  GENERAL: Not in distress   CHEST/LUNG: Not using accessory muscles   HEART: s1 and s2 present  ABDOMEN:  Nontender and  Nondistended  EXTREMITIES: No pedal  edema  CNS: Awake and Alert        LABS:                        12.5   12.11 )-----------( 232      ( 03 Dec 2020 07:03 )             37.3       12-03    145  |  110<H>  |  15  ----------------------------<  153<H>  3.2<L>   |  25  |  0.82    Ca    8.9      03 Dec 2020 07:03  Phos  1.7     12-03  Mg     2.4     12-03    TPro  7.8  /  Alb  2.5<L>  /  TBili  0.4  /  DBili  x   /  AST  39  /  ALT  42  /  AlkPhos  126<H>  12-02    PT/INR - ( 01 Dec 2020 18:19 )   PT: 12.4 sec;   INR: 1.04 ratio     PTT - ( 01 Dec 2020 18:19 )  PTT:33.8 sec        CAPILLARY BLOOD GLUCOSE  POCT Blood Glucose.: 151 mg/dL (03 Dec 2020 07:45)  POCT Blood Glucose.: 141 mg/dL (02 Dec 2020 22:31)  POCT Blood Glucose.: 151 mg/dL (02 Dec 2020 17:28)  POCT Blood Glucose.: 272 mg/dL (02 Dec 2020 11:08)        Urinalysis Basic - ( 01 Dec 2020 18:41 )  Color: Yellow / Appearance: Clear / S.015 / pH: x  Gluc: x / Ketone: Negative  / Bili: Negative / Urobili: Negative   Blood: x / Protein: 30 mg/dL / Nitrite: Negative   Leuk Esterase: Trace / RBC: 2-5 /HPF / WBC 3-5 /HPF   Sq Epi: x / Non Sq Epi: Occasional /HPF / Bacteria: Trace /HPF        MEDICATIONS  (STANDING):  aspirin enteric coated 81 milliGRAM(s) Oral daily  atorvastatin 40 milliGRAM(s) Oral at bedtime  azithromycin  IVPB 500 milliGRAM(s) IV Intermittent every 24 hours  cefTRIAXone   IVPB 1000 milliGRAM(s) IV Intermittent every 24 hours  guaiFENesin   Syrup  (Sugar-Free) 200 milliGRAM(s) Oral every 6 hours  heparin   Injectable 5000 Unit(s) SubCutaneous every 8 hours  insulin glargine Injectable (LANTUS) 5 Unit(s) SubCutaneous every morning  insulin lispro (ADMELOG) corrective regimen sliding scale   SubCutaneous Before meals and at bedtime  metoprolol tartrate 25 milliGRAM(s) Oral two times a day  potassium chloride    Tablet ER 40 milliEquivalent(s) Oral every 4 hours  potassium phosphate IVPB 30 milliMole(s) IV Intermittent once  sodium chloride 0.9%. 1000 milliLiter(s) (60 mL/Hr) IV Continuous <Continuous>    MEDICATIONS  (PRN):  acetaminophen   Tablet .. 650 milliGRAM(s) Oral every 6 hours PRN Temp greater or equal to 38C (100.4F), Moderate Pain (4 - 6)  ALBUTerol    90 MICROgram(s) HFA Inhaler 2 Puff(s) Inhalation every 6 hours PRN Shortness of Breath and/or Wheezing  ondansetron Injectable 4 milliGRAM(s) IV Push every 6 hours PRN Nausea and/or Vomiting        RADIOLOGY & ADDITIONAL TESTS:    20: CT Angio Chest w/ IV Cont (20 @ 22:01) Right middle lobe pneumonia with possible mild involvement in the right upper lobe.  A follow-up chest CT is recommended in one month afterantibiotic therapy and resolution of acute symptoms to ensure resolution of the imaging findings.    Partially visualized, there is nonspecific fat stranding/edema between the tail the pancreas and spleen, associated with thickening of the left anterior renal fascia.  Pancreatitis should be excluded based on clinical symptoms and laboratory values.    Small pleural effusions bilaterally. No pulmonary embolism.    20: Xray Chest 1 View-PORTABLE IMMEDIATE (20 @ 17:19) Interstitial prominence on the basis of pulmonary edema or interstitial pneumonia.  Mild cardiomegaly.        MICROBIOLOGY DATA:    COVID-19 PCR . (20 @ 11:57)   COVID-19 PCR: NotDetec:     COVID-19 Antibody - for prior infection screening (20 @ 09:29)   COVID-19 IgG Antibody Index: <0.10: Abbott CMIA   Negative Result <= 1.39 Index   Positive Result >= 1.40 Index Index   COVID-19 IgG Antibody Interpretation: Negative:    Culture - Urine (20 @ 22:13)   Specimen Source: .Urine Clean Catch (Midstream)   Culture Results:  <10,000 CFU/mL Normal Urogenital Leslie     Culture - Blood (20 @ 22:06)   Specimen Source: .Blood Blood-Peripheral   Culture Results: No growth to date.     Culture - Blood (20 @ 22:06)   Specimen Source: .Blood Blood-Peripheral   Culture Results: No growth to date.             
CHIEF COMPLAINT:    HPI: Patient is an 84 year old female from home, with PMH of DM, HTN, asthma, and HLD, who presented to the ED due to fever, chills and cough. Patient is a poor historian. Patient states that she came to the hospital due to feeling weak. Further history obtained from granddaughter, Traci Kolb ([phone]). As per granddaughter, patient had taken a shower on Friday and had taken longer than usual in the shower and also did not dry her hair after some time and was noted to feel warm later that night. Temperature was taken and noted to be 100.3 and patient also started to develop a cough and chills. Patient also has been having decreased appetite. Patient currently denies any chest pain or shortness of breath.     Interval history: Spoke using  ID: [id number]. Pt says she does not have shortness of breath or chest pain. Sh says that she feels better and would like to go home.     PAST MEDICAL & SURGICAL HISTORY:  Asthma    HLD (hyperlipidemia)    HTN (hypertension)    Diabetes mellitus        Allergies    No Known Allergies    Intolerances        MEDICATIONS  (STANDING):  aspirin enteric coated 81 milliGRAM(s) Oral daily  atorvastatin 40 milliGRAM(s) Oral at bedtime  azithromycin  IVPB 500 milliGRAM(s) IV Intermittent every 24 hours  cefTRIAXone   IVPB 1000 milliGRAM(s) IV Intermittent every 24 hours  guaiFENesin   Syrup  (Sugar-Free) 200 milliGRAM(s) Oral every 6 hours  heparin   Injectable 5000 Unit(s) SubCutaneous every 8 hours  insulin glargine Injectable (LANTUS) 5 Unit(s) SubCutaneous every morning  insulin lispro (ADMELOG) corrective regimen sliding scale   SubCutaneous Before meals and at bedtime  metoprolol tartrate 25 milliGRAM(s) Oral two times a day  potassium chloride    Tablet ER 40 milliEquivalent(s) Oral every 4 hours  potassium phosphate IVPB 30 milliMole(s) IV Intermittent once  sodium chloride 0.9%. 1000 milliLiter(s) (60 mL/Hr) IV Continuous <Continuous>    MEDICATIONS  (PRN):  acetaminophen   Tablet .. 650 milliGRAM(s) Oral every 6 hours PRN Temp greater or equal to 38C (100.4F), Moderate Pain (4 - 6)  ALBUTerol    90 MICROgram(s) HFA Inhaler 2 Puff(s) Inhalation every 6 hours PRN Shortness of Breath and/or Wheezing  ondansetron Injectable 4 milliGRAM(s) IV Push every 6 hours PRN Nausea and/or Vomiting      FAMILY HISTORY:    No family history of premature coronary artery disease or sudden cardiac death    SOCIAL HISTORY:  Smoking-  Alcohol-  Illicit Drug use-    REVIEW OF SYSTEMS:  Constitutional: [ ] fever, [ ]weight loss,  [ ]fatigue  Eyes: [ ] visual changes  Respiratory: [ ]shortness of breath;  [ ] cough, [ ]wheezing, [ ]chills, [ ]hemoptysis  Cardiovascular: [ ] chest pain, [ ]palpitations, [ ]dizziness,  [ ]leg swelling [ ]syncope  Gastrointestinal: [ ] abdominal pain, [ ]nausea, [ ]vomiting,  [ ]diarrhea   Genitourinary: [ ] dysuria, [ ] hematuria  Neurologic: [ ] headaches [ ] tremors  [ ] weakness [ ] lightheadedness  Skin: [ ] itching, [ ]burning, [ ] rashes  Endocrine: [ ] heat or cold intolerance  Musculoskeletal: [ ] joint pain or swelling; [ ] muscle, back, or extremity pain  Psychiatric: [ ] depression, [ ]anxiety, [ ]mood swings, or [ ]difficulty sleeping  Hematologic: [ ] easy bruising, [ ] bleeding gums       [ x] All others negative	  [ ] Unable to obtain    Vital Signs Last 24 Hrs  T(C): 36.4 (03 Dec 2020 08:13), Max: 37.2 (03 Dec 2020 05:28)  T(F): 97.5 (03 Dec 2020 08:13), Max: 98.9 (03 Dec 2020 05:28)  HR: 103 (03 Dec 2020 09:35) (73 - 105)  BP: 147/69 (03 Dec 2020 09:35) (106/66 - 161/75)  BP(mean): 90 (03 Dec 2020 09:35) (90 - 95)  RR: 18 (03 Dec 2020 08:13) (17 - 22)  SpO2: 97% (03 Dec 2020 09:35) (93% - 99%)  I&O's Summary      PHYSICAL EXAM:  General: No acute distress, Thin built elderly female   HEENT: EOMI, PERRL  Neck: Supple, No JVD  Lungs: Clear to auscultation bilaterally; No rales or wheezing  Heart: Regular rate and rhythm; No murmurs, rubs, or gallops  Abdomen: Nontender, bowel sounds present  Extremities: No clubbing, cyanosis, or edema  Nervous system:  Alert & Oriented X3, no focal deficits  Skin: No rashes or lesions      LABS:  12-03    145  |  110<H>  |  15  ----------------------------<  153<H>  3.2<L>   |  25  |  0.82    Ca    8.9      03 Dec 2020 07:03  Phos  1.7     12-03  Mg     2.4     12-03    TPro  7.8  /  Alb  2.5<L>  /  TBili  0.4  /  DBili  x   /  AST  39  /  ALT  42  /  AlkPhos  126<H>  12-02    Creatinine Trend: 0.82<--, 1.13<--, 1.47<--                        12.5   12.11 )-----------( 232      ( 03 Dec 2020 07:03 )             37.3     PT/INR - ( 01 Dec 2020 18:19 )   PT: 12.4 sec;   INR: 1.04 ratio         PTT - ( 01 Dec 2020 18:19 )  PTT:33.8 sec    Lipid Panel:   Cardiac Enzymes: CARDIAC MARKERS ( 02 Dec 2020 01:17 )  0.061 ng/mL / x     / x     / x     / x      CARDIAC MARKERS ( 01 Dec 2020 18:19 )  0.078 ng/mL / x     / x     / x     / x          Serum Pro-Brain Natriuretic Peptide: 40928 pg/mL (12-01-20 @ 18:19)        RADIOLOGY: durbin< from: CT Angio Chest w/ IV Cont (12.01.20 @ 22:01) >    EXAM:  CT ANGIO CHEST (W)AW IC                            PROCEDURE DATE:  12/01/2020          INTERPRETATION:  CLINICAL INFORMATION: Shortness of breath.    COMPARISON: None.    PROCEDURE:  CT Angiography of the Chest.  56 mL of Omnipaque 350 was injected intravenously.  44 mL were discarded.  Sagittal and coronal reformats were performed as well as 3D (MIP) reconstructions.    FINDINGS:    LUNGS AND AIRWAYS: Extensive respiratory motion artifact.  Central airways are grossly clear.  There is patchy airspace opacity predominantly in the right middle lobe with possible involvement of the right upper lobe, likely reflecting pneumonia.  Subsegmental atelectasis dependently in both lower lobes.  PLEURA: Small pleural effusions bilaterally.  MEDIASTINUM AND ALEXANDRA: No lyNo lymphadenopathy.ELS: No pulmonary embolism.  No thoracic aortic aneurysm or dissection.  Atherosclerotic calcifications of the thoracic aorta, arch vessels and coronary arteries.  HEART: Cardiomegaly. No peNo pericardial effusion.T WALL AND LOWER NECK: WithiWithin normal limits.ALIZED UPPER ABDOMEN: Partially visualized, there is nonspecific fat stranding/edema between the tail the pancreas and spleen associated with thickening of the left anterior renal fascia.  Approximately 1.1 cm left renal cyst.  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    IMPRESSION:  Right middle lobe pneumonia with possible mild involvement in the right upper lobe.  A follow-up chest CT is recommended in one month afterantibiotic therapy and resolution of acute symptoms to ensure resolution of the imaging findings.    Partially visualized, there is nonspecific fat stranding/edema between the tail the pancreas and spleen, associated with thickening of the left anterior renal fascia.  Pancreatitis should be excluded based on clinical symptoms and laboratory values.    Small pleural effusions bilaterally.    No pulmonary embolism.          BELIA MAURER MD; Attending Radiologist  This document has been electronically signed. Dec  1 2020 10:41PM    ECG [my interpretation]:  Sinus tachycardia    TELEMETRY: Sinus tachycardia    
PULMONARY CONSULT NOTE      SUSANNAH HUTCHISON  MRN-[id number]    Patient is a 84y old  Female who presents with a chief complaint of fever and cough (02 Dec 2020 01:43)  Hx chart lab and xrays reviewed  Pt examined by me and discussed with ER staff      HISTORY OF PRESENT ILLNESS: Patient is an 84 year old female from home, with PMH of DM, HTN, asthma, and HLD, who presented to the ED due to fever, chills and cough. Patient is a poor historian. Patient states that she came to the hospital due to feeling weak. Further history obtained from granddaughter, Traci Kolb ([phone]). As per granddaughter, patient had taken a shower on Friday and had taken longer than usual in the shower and also did not dry her hair after some time and was noted to feel warm later that night. Temperature was taken and noted to be 100.3 and patient also started to develop a cough and chills. Patient also has been having decreased appetite. Patient currently denies any chest pain or shortness of breath.           MEDICATIONS  (STANDING):  aspirin enteric coated 81 milliGRAM(s) Oral daily  atorvastatin 40 milliGRAM(s) Oral at bedtime  azithromycin  IVPB 500 milliGRAM(s) IV Intermittent every 24 hours  cefTRIAXone   IVPB 1000 milliGRAM(s) IV Intermittent every 24 hours  heparin   Injectable 5000 Unit(s) SubCutaneous every 8 hours  insulin glargine Injectable (LANTUS) 5 Unit(s) SubCutaneous every morning  insulin lispro (ADMELOG) corrective regimen sliding scale   SubCutaneous Before meals and at bedtime  metoprolol tartrate 25 milliGRAM(s) Oral two times a day  sodium chloride 0.9%. 1000 milliLiter(s) (60 mL/Hr) IV Continuous <Continuous>      MEDICATIONS  (PRN):  acetaminophen   Tablet .. 650 milliGRAM(s) Oral every 6 hours PRN Temp greater or equal to 38C (100.4F), Moderate Pain (4 - 6)  ALBUTerol    90 MICROgram(s) HFA Inhaler 2 Puff(s) Inhalation every 6 hours PRN Shortness of Breath and/or Wheezing  ondansetron Injectable 4 milliGRAM(s) IV Push every 6 hours PRN Nausea and/or Vomiting      Allergies    No Known Allergies            PAST MEDICAL & SURGICAL HISTORY:  Asthma    HLD (hyperlipidemia)    HTN (hypertension)    Diabetes mellitus        FAMILY HISTORY:  HTN+    DM+  IHD--   Asthma--  COPD --    SOCIAL HISTORY SMOKING --   ETOH--     DRUGS--   with kids , Lives at home with family      REVIEW OF SYSTEMS:  CONSTITUTIONAL: fever+, weight loss,  fatigue +  EYES: No eye pain, visual disturbances, or discharge  ENT:  No difficulty hearing, tinnitus, vertigo; No sinus or throat pain  NECK: No pain or stiffness   RESPIRATORY:  cough++   wheezing--   chills+   hemoptysis --   Shortness of Breath++  CARDIOVASCULAR: No chest pain, palpitations, passing out, dizziness, or leg swelling  GASTROINTESTINAL: No abdominal or epigastric pain. No nausea, vomiting, or hematemesis; No diarrhea or constipation. No melena or hematochezia.  GENITOURINARY: No dysuria, frequency, hematuria, or incontinence  NEUROLOGICAL: No headaches, memory loss, loss of strength, numbness, or tremors  SKIN: No itching, burning, rashes, or lesions   LYMPH Nodes: No enlarged glands  ENDOCRINE: No heat or cold intolerance; No hair loss  MUSCULOSKELETAL: No joint pain or swelling; No muscle, back, or extremity pain  PSYCHIATRIC: No depression, anxiety, mood swings, or difficulty sleeping  HEME/LYMPH: No easy bruising, or bleeding gums  ALLERGY AND IMMUNOLOGIC: No hives or eczema      Vital Signs Last 24 Hrs  T(C): 36.7 (02 Dec 2020 07:54), Max: 37.2 (01 Dec 2020 16:33)  T(F): 98 (02 Dec 2020 07:54), Max: 99 (01 Dec 2020 16:33)  HR: 96 (02 Dec 2020 07:54) (96 - 122)  BP: 140/62 (02 Dec 2020 07:54) (100/53 - 140/65)  BP(mean): --  RR: 20 (02 Dec 2020 07:54) (20 - 34)  SpO2: 98% (02 Dec 2020 07:54) (97% - 100%)  I&O's Detail      PHYSICAL EXAMINATION:    GENERAL: The patient is a thin female in no apparent distress.   SKIN: No rashes ecchymoses or cyanosis  HEENT: Head is normocephalic and atraumatic. Extraocular muscles are intact. Mucous membranes are moist.   Neck supple LN not felt, JVP not increased  Thyroid not enlarged  Lymphatic: No lymphadenopathy  Cardiovascular:  S1 S2  heard ,RSR , JVP not increased , syst murmur at apex, No  gallop or rub  Respiratory:  Symmetrical chest wall movements Breathing vesicular , Percussion note normal no dulness   with bibasilar  rales and rt mammary area, no  wheeze  ABDOMEN:  Soft, Non-tender,   No  hepatosplenomegaly ,BS positive		  Extremities: Normal range of motion, No clubbing, cyanosis or edema , No calf tenderness  Vascular: Peripheral pulses palpable 2+ bilaterally  CNS: Alert and oriented x3,  Mood and affect appropriate  Cranial nerves intact  sensory intact  motor Power 5/5, DTR 2+   Babinski neg    LABS:                        12.0   14.27 )-----------( 174      ( 02 Dec 2020 05:54 )             35.8     12    143  |  112<H>  |  16  ----------------------------<  311<H>  3.5   |  24  |  1.13    Ca    8.6      02 Dec 2020 05:54  Phos  1.1       Mg     2.7         TPro  7.8  /  Alb  2.5<L>  /  TBili  0.4  /  DBili  x   /  AST  39  /  ALT  42  /  AlkPhos  126<H>  12    PT/INR - ( 01 Dec 2020 18:19 )   PT: 12.4 sec;   INR: 1.04 ratio         PTT - ( 01 Dec 2020 18:19 )  PTT:33.8 sec  Urinalysis Basic - ( 01 Dec 2020 18:41 )    Color: Yellow / Appearance: Clear / S.015 / pH: x  Gluc: x / Ketone: Negative  / Bili: Negative / Urobili: Negative   Blood: x / Protein: 30 mg/dL / Nitrite: Negative   Leuk Esterase: Trace / RBC: 2-5 /HPF / WBC 3-5 /HPF   Sq Epi: x / Non Sq Epi: Occasional /HPF / Bacteria: Trace /HPF        CARDIAC MARKERS ( 02 Dec 2020 01:17 )  0.061 ng/mL / x     / x     / x     / x      CARDIAC MARKERS ( 01 Dec 2020 18:19 )  0.078 ng/mL / x     / x     / x     / x          D-Dimer Assay, Quantitative: 1439 ng/mL DDU (20 @ 18:19)    Serum Pro-Brain Natriuretic Peptide: 82724 pg/mL (20 @ 18:19)    Lactate, Blood: 1.5 mmol/L (20 @ 05:54)  Lactate, Blood: 2.5 mmol/L (20 @ 20:59)  Lactate, Blood: 2.3 mmol/L (20 @ 18:19)    RADIOLOGY & ADDITIONAL STUDIES:    CXR:  < from: Xray Chest 1 View-PORTABLE IMMEDIATE (20 @ 17:19) >  Interstitial prominence on the basis of  interstitial pneumonia.        Ct scan chest: < from: CT Angio Chest w/ IV Cont (20 @ 22:01) >  Right middle lobe pneumonia with possible mild involvement in the right upper lobe.  A follow-up chest CT is recommended in one month afterantibiotic therapy and resolution of acute symptoms to ensure resolution of the imaging findings.    Partially visualized, there is nonspecific fat stranding/edema between the tail the pancreas and spleen, associated with thickening of the left anterior renal fascia.  Pancreatitis should be excluded based on clinical symptoms and laboratory values.    Small pleural effusions bilaterally.    No pulmonary embolism.            ekg; sinus tachycardia, APC  RAD

## 2020-12-03 NOTE — PROGRESS NOTE ADULT - SUBJECTIVE AND OBJECTIVE BOX
PULMONARY  progress note    SUSANNAH HUTCHISON  MRN-[id number]    Patient is a 84y old  Female who presents with a chief complaint of fever and cough (03 Dec 2020 09:33)  Feels better, less cough and sob      MEDICATIONS  (STANDING):  aspirin enteric coated 81 milliGRAM(s) Oral daily  atorvastatin 40 milliGRAM(s) Oral at bedtime  azithromycin  IVPB 500 milliGRAM(s) IV Intermittent every 24 hours  cefTRIAXone   IVPB 1000 milliGRAM(s) IV Intermittent every 24 hours  guaiFENesin   Syrup  (Sugar-Free) 200 milliGRAM(s) Oral every 6 hours  heparin   Injectable 5000 Unit(s) SubCutaneous every 8 hours  insulin glargine Injectable (LANTUS) 5 Unit(s) SubCutaneous every morning  insulin lispro (ADMELOG) corrective regimen sliding scale   SubCutaneous Before meals and at bedtime  metoprolol tartrate 25 milliGRAM(s) Oral two times a day  potassium chloride    Tablet ER 40 milliEquivalent(s) Oral every 4 hours  potassium phosphate IVPB 30 milliMole(s) IV Intermittent once  sodium chloride 0.9%. 1000 milliLiter(s) (60 mL/Hr) IV Continuous <Continuous>      MEDICATIONS  (PRN):  acetaminophen   Tablet .. 650 milliGRAM(s) Oral every 6 hours PRN Temp greater or equal to 38C (100.4F), Moderate Pain (4 - 6)  ALBUTerol    90 MICROgram(s) HFA Inhaler 2 Puff(s) Inhalation every 6 hours PRN Shortness of Breath and/or Wheezing  ondansetron Injectable 4 milliGRAM(s) IV Push every 6 hours PRN Nausea and/or Vomiting      Allergies    No Known Allergies        PAST MEDICAL & SURGICAL HISTORY:  Asthma    HLD (hyperlipidemia)    HTN (hypertension)    Diabetes mellitus             REVIEW OF SYSTEMS:  CONSTITUTIONAL: No fever, weight loss, or fatigue   EYES: No eye pain, visual disturbances, or discharge  ENT:  No difficulty hearing, tinnitus, vertigo; No sinus or throat pain  NECK: No pain or stiffness or nodes  RESPIRATORY:  cough +, wheezing--  chills--   hemoptysis -- Shortness of Breath+  CARDIOVASCULAR: No chest pain, palpitations, passing out, dizziness, or leg swelling  GASTROINTESTINAL: No abdominal or epigastric pain. No nausea, vomiting, or hematemesis; No diarrhea or constipation. No melena or hematochezia.  GENITOURINARY: No dysuria, frequency, hematuria, or incontinence  NEUROLOGICAL: No headaches, memory loss, loss of strength, numbness, or tremors  SKIN: No itching, burning, rashes, or lesions   LYMPH Nodes: No enlarged glands  ENDOCRINE: No heat or cold intolerance; No hair loss  ALLERGY AND IMMUNOLOGIC: No hives or eczema      Vital Signs Last 24 Hrs  T(C): 36.4 (03 Dec 2020 08:13), Max: 37.2 (03 Dec 2020 05:28)  T(F): 97.5 (03 Dec 2020 08:13), Max: 98.9 (03 Dec 2020 05:28)  HR: 103 (03 Dec 2020 09:35) (73 - 105)  BP: 147/69 (03 Dec 2020 09:35) (106/66 - 161/75)  BP(mean): 90 (03 Dec 2020 09:35) (90 - 95)  RR: 18 (03 Dec 2020 08:13) (17 - 22)  SpO2: 97% (03 Dec 2020 09:35) (93% - 99%)  I&O's Detail      PHYSICAL EXAMINATION:    GENERAL: The patient is a thin female in no apparent distress.   SKIN no rash ecchymoses or bruises  HEENT: Head is normocephalic and atraumatic  NICCI , Extraocular muscles are intact. Mucous membranes  moist.   Neck supple ,No LN felt JVP not increased  Thyroid not enlarged  Cardiovascular:  S1 S2 heard, RSR, No JVD , systolic  murmur at apex, No gallop or rub  Respiratory: Chest wall symmetrical with good air entry ,Percussion note normal,    Lungs vesicular breathing with rt basilar   rales , no   wheeze	  ABDOMEN:  Soft, Non-tender,  no hepatomegaly or splenomegaly BS positive	  Extremities: Normal range of motion, No clubbing, cyanosis or edema  Vascular: Peripheral pulses palpable 2+ bilaterally  CNS:  Alert and oriented x3   Cranial nerves intact  sensory intact  motor power5/5  dtr 2+   Babinski neg    LABS:                        12.5   12.11 )-----------( 232      ( 03 Dec 2020 07:03 )             37.3     12-03    145  |  110<H>  |  15  ----------------------------<  153<H>  3.2<L>   |  25  |  0.82    Ca    8.9      03 Dec 2020 07:03  Phos  1.7       Mg     2.4         TPro  7.8  /  Alb  2.5<L>  /  TBili  0.4  /  DBili  x   /  AST  39  /  ALT  42  /  AlkPhos  126<H>      PT/INR - ( 01 Dec 2020 18:19 )   PT: 12.4 sec;   INR: 1.04 ratio         PTT - ( 01 Dec 2020 18:19 )  PTT:33.8 sec  Urinalysis Basic - ( 01 Dec 2020 18:41 )    Color: Yellow / Appearance: Clear / S.015 / pH: x  Gluc: x / Ketone: Negative  / Bili: Negative / Urobili: Negative   Blood: x / Protein: 30 mg/dL / Nitrite: Negative   Leuk Esterase: Trace / RBC: 2-5 /HPF / WBC 3-5 /HPF   Sq Epi: x / Non Sq Epi: Occasional /HPF / Bacteria: Trace /HPF        CARDIAC MARKERS ( 02 Dec 2020 01:17 )  0.061 ng/mL / x     / x     / x     / x      CARDIAC MARKERS ( 01 Dec 2020 18:19 )  0.078 ng/mL / x     / x     / x     / x            Serum Pro-Brain Natriuretic Peptide: 23720 pg/mL (20 @ 18:19)      Procalcitonin, Serum: 7.37 ng/mL (20 @ 04:00)     TSH 0.19    HbA1C  6.4    Ferritin, Serum: 268 ng/mL (20 @ 09:39)  Folate, Serum: >20.0 ng/mL (20 @ 09:39)  Vitamin B12, Serum: >2000 pg/mL (20 @ 09:39)  Ferritin, Serum: 228 ng/mL (20 @ 04:00)      MICROBIOLOGY:    Culture - Urine (collected 20 @ 22:13)  Source: .Urine Clean Catch (Midstream)  Final Report (20 @ 20:44):    <10,000 CFU/mL Normal Urogenital Leslie    Culture - Blood (collected 20 @ 22:06)  Source: .Blood Blood-Peripheral  Preliminary Report (20 @ 23:01):    No growth to date.    Culture - Blood (collected 20 @ 22:06)  Source: .Blood Blood-Peripheral  Preliminary Report (20 @ 23:01):    No growth to date.          RADIOLOGY & ADDITIONAL STUDIES:    CXR:< from: Xray Chest 1 View-PORTABLE IMMEDIATE (20 @ 17:19) >  Interstitial prominence on the basis of interstitial pneumonia.

## 2020-12-03 NOTE — DISCHARGE NOTE PROVIDER - NSDCMRMEDTOKEN_GEN_ALL_CORE_FT
albuterol 1.25 mg/3 mL (0.042%) inhalation solution: 3 milliliter(s) inhaled every 8 hours, As Needed  alendronate 70 mg oral tablet: 1 tab(s) orally once a week, before breakfast (don&#x27;t lie down for 30 minutes)  amLODIPine 10 mg oral tablet: 1 tab(s) orally once a day  Artificial Tears ophthalmic solution: 1 drop(s) to each affected eye 4 times a day  aspirin 81 mg oral tablet: orally once a day  azithromycin 250 mg oral capsule: two tablets by mouth on first day, then one tablet once daily for 4 days  benzonatate 200 mg oral capsule: 1 cap(s) orally 3 times a day  famotidine 40 mg/5 mL oral liquid: 5 milliliter(s) orally once a day (at bedtime)  Fish Oil 1000 mg oral capsule: 1  orally once a day  fluticasone propionate: 50 microgram(s) inhaled 2 times a day  gabapentin 300 mg oral capsule: orally 2 times a day  lidocaine 2.5% topical ointment: Apply topically to affected area 2 times a day  loratadine 10 mg oral tablet: 1 tab(s) orally once a day  Mapap 500 mg oral tablet: 1 tab(s) orally every 6 hours  metFORMIN 500 mg oral tablet, extended release: 1 tab(s) orally once a day, in the evening with food  metoprolol succinate 50 mg oral tablet, extended release: 1 tab(s) orally once a day  Oyster Shell Calcium 500 (1250 mg calcium carbonate) oral tablet: 1  orally once a day  Procto-Med HC 2.5% rectal cream with applicator: 1 application rectal 2 times a day  promethazine 6.25 mg/5 mL oral syrup: 5 milliliter(s) orally every 6 hours, As Needed  Reguloid Sugar Free oral powder for reconstitution: mix 1 teaspoon with 8 oz of liquid and drink by mouth once a day as directed  Senna Plus 50 mg-8.6 mg oral tablet: 1  orally once a day (at bedtime), As Needed  simvastatin 20 mg oral tablet: 1 tab(s) orally once a day (at bedtime)  Vitamin B Complex 100: 1  orally once a day   albuterol 1.25 mg/3 mL (0.042%) inhalation solution: 3 milliliter(s) inhaled every 8 hours, As Needed  alendronate 70 mg oral tablet: 1 tab(s) orally once a week, before breakfast (don&#x27;t lie down for 30 minutes)  Artificial Tears ophthalmic solution: 1 drop(s) to each affected eye 4 times a day  aspirin 81 mg oral tablet: orally once a day  azithromycin 500 mg oral tablet: 1 tab(s) orally once a day   benzonatate 200 mg oral capsule: 1 cap(s) orally 3 times a day  cefuroxime 500 mg oral tablet: 1 tab(s) orally 2 times a day   famotidine 40 mg/5 mL oral liquid: 5 milliliter(s) orally once a day (at bedtime)  Fish Oil 1000 mg oral capsule: 1  orally once a day  fluticasone propionate: 50 microgram(s) inhaled 2 times a day  gabapentin 300 mg oral capsule: orally 2 times a day  lidocaine 2.5% topical ointment: Apply topically to affected area 2 times a day  loratadine 10 mg oral tablet: 1 tab(s) orally once a day  Mapap 500 mg oral tablet: 1 tab(s) orally every 6 hours  metFORMIN 500 mg oral tablet, extended release: 1 tab(s) orally once a day, in the evening with food  metoprolol succinate 50 mg oral tablet, extended release: 1 tab(s) orally once a day  Oyster Shell Calcium 500 (1250 mg calcium carbonate) oral tablet: 1  orally once a day  Procto-Med HC 2.5% rectal cream with applicator: 1 application rectal 2 times a day  promethazine 6.25 mg/5 mL oral syrup: 5 milliliter(s) orally every 6 hours, As Needed  Reguloid Sugar Free oral powder for reconstitution: mix 1 teaspoon with 8 oz of liquid and drink by mouth once a day as directed  Senna Plus 50 mg-8.6 mg oral tablet: 1  orally once a day (at bedtime), As Needed  simvastatin 20 mg oral tablet: 1 tab(s) orally once a day (at bedtime)  Vitamin B Complex 100: 1  orally once a day

## 2020-12-03 NOTE — PROGRESS NOTE ADULT - PROBLEM SELECTOR PLAN 5
echo   catrdilogy on case
albuterol PRN   primary team to confirm home meds  monitor O2 sats
albuterol PRN   primary team to confirm home meds  monitor O2 sats

## 2020-12-03 NOTE — CONSULT NOTE ADULT - ASSESSMENT
84 year old female from home, with PMH of DM, HTN, asthma, and HLD, who presented to the ED due to fever, chills and cough.  Cardiology was consulted for elevated troponin.

## 2020-12-03 NOTE — CONSULT NOTE ADULT - ASSESSMENT
Patient is a 84y old  Female from home, with PMH of DM, HTN, asthma, and HLD, who presented to the ER for evaluation of fever, chills and cough.  As per granddaughter, patient had taken a shower on Friday and had taken longer than usual in the shower and also did not dry her hair after some time and was noted to feel warm later that night. Temperature was taken and noted to be 100.3 and patient also started to develop a cough and chills. Patient also has been having decreased appetite. ON admission, she found to have tachycardia, tachypnea and Leukocytosis. The CT chest shows no PE but Right middle lobe pneumonia with possible mild involvement in the right upper lobe. She has started on Ceftriaxone and Azithromycin, and the ID consult requested to assist with further evaluation and antibiotic management.    # Sepsis ( Tachycardia + Tachypnea + Leukocytosis)  # Right sided pneumonia  # COVID PCR negative X z    would recommend:    1. Follow up final cultures  2. Please obtain Nasal swab for MRSA PCR   3. Legionella urine Ag  4. Continue Ceftriaxone and Azithromycin until work up is done  5. Monitor WBC count  and QTc whil dexter Azithromycin   6. Supplemental oxygenation and bronchodilator as needed    will follow the patient with you and make further recommendation based on the clinical course and Lab results  Thank you for the opportunity to participate in Ms. HUTCHISON's care      Attending Attestation:    Spent more than 65 minutes on total encounter, more than 50 % of the visit was spent counseling and/or coordinating care by the Attending physician.       Patient is a 84y old  Female from home, with PMH of DM, HTN, asthma, and HLD, who presented to the ER for evaluation of fever, chills and cough.  As per granddaughter, patient had taken a shower on Friday and had taken longer than usual in the shower and also did not dry her hair after some time and was noted to feel warm later that night. Temperature was taken and noted to be 100.3 and patient also started to develop a cough and chills. Patient also has been having decreased appetite. ON admission, she found to have tachycardia, tachypnea and Leukocytosis. The CT chest shows no PE but Right middle lobe pneumonia with possible mild involvement in the right upper lobe. She has started on Ceftriaxone and Azithromycin, and the ID consult requested to assist with further evaluation and antibiotic management.    # Sepsis ( Tachycardia + Tachypnea + Leukocytosis)  # Right sided pneumonia  # COVID PCR negative X z    would recommend:    1. Follow up final cultures  2. Please obtain Nasal swab for MRSA PCR   3. Legionella urine Ag  4. Continue Ceftriaxone and Azithromycin until work up is done  5. Monitor WBC count  and QTc while on Azithromycin   6. Supplemental oxygenation and bronchodilator as needed    d/w Patient and her son at the bed side    will follow the patient with you and make further recommendation based on the clinical course and Lab results  Thank you for the opportunity to participate in Ms. HUTCHISON's care      Attending Attestation:    Spent more than 65 minutes on total encounter, more than 50 % of the visit was spent counseling and/or coordinating care by the Attending physician.

## 2020-12-03 NOTE — PROGRESS NOTE ADULT - PROBLEM SELECTOR PLAN 10
discussed GOC with granddaughter  patient is FULL CODE
discussed GOC with granddaughter  patient is FULL CODE

## 2020-12-03 NOTE — PROGRESS NOTE ADULT - PROBLEM SELECTOR PLAN 6
primary team to confirm home meds   will hold BP meds for now in setting of sepsis and normal BP  monitor BP and add meds as needed  Cardiology (Dr. Rosas) was consulted for elevated troponin
primary team to confirm home meds   will hold BP meds for now in setting of sepsis and normal BP  monitor BP and add meds as needed

## 2020-12-03 NOTE — PROGRESS NOTE ADULT - SUBJECTIVE AND OBJECTIVE BOX
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 Patient is a poor historian and history and HPI was obtained from granddaughter, Traci Kolb ([phone]) who reports that patient did not dry her hair after a long shower and was noted to have a temperature of 100.3 that night and developed symptoms of cough, chills and decreased appetite.       ON admission in the ED, Leukocytosis of 13.45. Hgb of 11.0. Creatinine of 1.47. D-dimer of 1439. Troponin of 0.078. UA negative. COVID negative. CXR showed interstitial prominence. CT chest shows no PE but Right middle lobe pneumonia with possible mild involvement in the right upper lobe. She was started on Ceftriaxone and Azithromycin, and the ID consult requested to assist with further evaluation and antibiotic management. EKG showed sinus tachycardia. Patient was given duonebs, dose of decadron, magnesium sulfate, aspirin, ceftriaxone and azithromycin in ED. Given 2L NS bolus. Patient was admitted for PNA.     INTERVAL HPI/OVERNIGHT EVENTS:   No significant events overnight, vitals are within normal limits. Patient evaluated at bedside using Telugu . Patient denies chest pain, shortness of breath, abdominal pain, N/V. Spoke to patients son at bedside who reported patient forgot her dentures at home and is only able to have mashed or pureed food because she is unable to chew. Ensure drink was encouraged.    REVIEW OF SYSTEMS:  See HPI, all else negative.    Vital Signs Last 24 Hrs  T(C): 36.9 (03 Dec 2020 15:56), Max: 37.2 (03 Dec 2020 05:28)  T(F): 98.5 (03 Dec 2020 15:56), Max: 98.9 (03 Dec 2020 05:28)  HR: 95 (03 Dec 2020 15:56) (73 - 105)  BP: 135/67 (03 Dec 2020 15:56) (106/66 - 161/75)  BP(mean): 90 (03 Dec 2020 09:35) (90 - 95)  RR: 17 (03 Dec 2020 15:56) (17 - 20)  SpO2: 97% (03 Dec 2020 15:56) (93% - 98%)    PHYSICAL EXAMINATION:  GENERAL: NAD, thin elderly female.    HEAD: Normocephalic; atraumatic.   EYES: conjunctiva and sclera clear.   NECK: Supple; non tender; no palpable lymph nodes.   CHEST/LUNG: No wheezes, rales or rhonchi.   HEART: regular rate and rhythm; no murmurs, gallops, or rubs.    ABDOMEN: soft; nontender; nondistended; bowel sounds present in all 4 quadrants.  EXTREMITIES: No clubbing, cyanosis or edema.   NERVOUS SYSTEM: Grossly unremarkable. Awake, alert, cooperative and appropriate.                           12.5   12.11 )-----------( 232      ( 03 Dec 2020 07:03 )             37.3     12-03    145  |  110<H>  |  15  ----------------------------<  153<H>  3.2<L>   |  25  |  0.82    Ca    8.9      03 Dec 2020 07:03  Phos  1.7     12-03  Mg     2.4     12-03    TPro  7.8  /  Alb  2.5<L>  /  TBili  0.4  /  DBili  x   /  AST  39  /  ALT  42  /  AlkPhos  126<H>  12-02    LIVER FUNCTIONS - ( 02 Dec 2020 05:54 )  Alb: 2.5 g/dL / Pro: 7.8 g/dL / ALK PHOS: 126 U/L / ALT: 42 U/L DA / AST: 39 U/L / GGT: x           CARDIAC MARKERS ( 02 Dec 2020 01:17 )  0.061 ng/mL / x     / x     / x     / x      CARDIAC MARKERS ( 01 Dec 2020 18:19 )  0.078 ng/mL / x     / x     / x     / x          PT/INR - ( 01 Dec 2020 18:19 )   PT: 12.4 sec;   INR: 1.04 ratio         PTT - ( 01 Dec 2020 18:19 )  PTT:33.8 sec    Urine Microscopic-Add On (NC) (12.01.20 @ 18:41)   Epithelial Cells: Occasional /HPF   Bacteria: Trace /HPF   Red Blood Cell - Urine: 2-5 /HPF   White Blood Cell - Urine: 3-5 /HPF   Culture - Urine (12.01.20 @ 22:13)   Specimen Source: .Urine Clean Catch (Midstream)   Culture Results:   <10,000 CFU/mL Normal Urogenital Leslie Culture - Blood (12.01.20 @ 22:06)   Specimen Source: .Blood Blood-Peripheral   Culture Results:   No growth to date. Culture - Blood (12.01.20 @ 22:06)   Specimen Source: .Blood Blood-Peripheral   Culture Results:   No growth to date.   Lactate, Blood (12.02.20 @ 05:54)   Lactate, Blood: 1.5 mmol/L   RADIOLOGY & ADDITIONAL TESTS:    12/1/20: Xray Chest 1 View-PORTABLE IMMEDIATE (12.01.20 @ 17:19) Interstitial prominence on the basis of pulmonary edema or interstitial pneumonia.  Mild cardiomegaly.     12/1/20: CT Angio Chest w/ IV Cont (12.01.20 @ 22:01) Right middle lobe pneumonia with possible mild involvement in the right upper lobe.  A follow-up chest CT is recommended in one month afterantibiotic therapy and resolution of acute symptoms to ensure resolution of the imaging findings.

## 2020-12-03 NOTE — DISCHARGE NOTE PROVIDER - NSDCCPCAREPLAN_GEN_ALL_CORE_FT
PRINCIPAL DISCHARGE DIAGNOSIS  Diagnosis: Pneumonia  Assessment and Plan of Treatment: You presented with fever, cough and weakness. Your white count was elevated. Your Chest xray showed some patchy infiltrate. Your CT chest showed right middle lobe pneumonia. You were treated with intravenous antibiotics of rocephin and zithromax. Please take the prescribed oral antibiotics. Follow up with your primary care doctor in 1 week.      SECONDARY DISCHARGE DIAGNOSES  Diagnosis: Elevated troponin  Assessment and Plan of Treatment: On admission, your troponin level was elevated. EKG showed sinus tachycardia. Echo showed ####  Your troponin levels trended down which most probably was due to the acute infectious process.    Diagnosis: SILVANO (acute kidney injury)  Assessment and Plan of Treatment: On admission, your creatinine levels were elevated. Intravenous fluids were given and your kidney functions improved, most probably due to dehydration.     PRINCIPAL DISCHARGE DIAGNOSIS  Diagnosis: Pneumonia  Assessment and Plan of Treatment: You presented with fever, cough and weakness. Your white count was elevated. Your Chest xray showed some patchy infiltrate. Your CT chest showed right middle lobe pneumonia. You were treated with intravenous antibiotics of rocephin and zithromax. Please take the prescribed oral antibiotics. Follow up with your primary care doctor in 1 week.      SECONDARY DISCHARGE DIAGNOSES  Diagnosis: Elevated troponin  Assessment and Plan of Treatment: On admission, your troponin level was elevated. EKG showed sinus tachycardia. Echo showed ####  Your troponin levels trended down which most probably was due to the acute infectious process. You were treated with metoprolol and atorvastatin.    Diagnosis: SILVANO (acute kidney injury)  Assessment and Plan of Treatment: On admission, your creatinine levels were elevated. Intravenous fluids were given and your kidney functions improved, most probably due to dehydration.    Diagnosis: Diabetes mellitus  Assessment and Plan of Treatment: You have history of Diabetes mellitus. Your Hemoglobin A1c is 6.4, You were treated with insulin. You will need to continue with your previous home medication of metformin.     PRINCIPAL DISCHARGE DIAGNOSIS  Diagnosis: CAP (community acquired pneumonia)  Assessment and Plan of Treatment: You presented with fever, cough and weakness. Your white blood cell count was elevated. Your Chest xray showed some patchy infiltrates suggestive of pneumonia. Your CT chest confirmed right middle lobe pneumonia. You were treated with intravenous antibiotics of rocephin and zithromax inpatient. Please take the prescribed oral antibiotics. Follow up with your primary care doctor in 1 week.      SECONDARY DISCHARGE DIAGNOSES  Diagnosis: Diabetes mellitus  Assessment and Plan of Treatment: You have history of Diabetes mellitus. Your Hemoglobin A1c is 6.4. Continue taking metformin as prescribed.    Diagnosis: Elevated troponin  Assessment and Plan of Treatment: On admission, your troponin level was elevated. EKG showed sinus tachycardia. Your troponin levels trended down which most probably was due to the acute infectious process. You were treated with metoprolol and atorvastatin. No further treatment is needed at this time.    Diagnosis: SILVANO (acute kidney injury)  Assessment and Plan of Treatment: On admission, your creatinine levels were elevated. Intravenous fluids were given and your kidney functions improved, most probably due to dehydration. No further testing is needed at this time.

## 2020-12-03 NOTE — DISCHARGE NOTE PROVIDER - HOSPITAL COURSE
Patient is a 83 yo, Yoruba speaking,  Female from home, with PMHx of DM, HTN, asthma, and HLD, who presented to the ED with chief complaints of fever and cough. Patient is a poor historian and history and HPI was obtained from granddaughter, Traci Kolb ([phone]) who reports that patient did not dry her hair after a long shower and was noted to have a temperature of 100.3 that night and developed symptoms of cough, chills and decreased appetite.    On admission in the ED, Leukocytosis, UA negative. COVID negative. CXR showed infiltrates. CT chest shows no PE but Right middle lobe pneumonia with possible mild involvement in the right upper lobe. She was started on Ceftriaxone and Azithromycin. Pt will be discharged on oral azithromycin and ceftin. Troponins were elevated. EKG showed sinus tachycardia  Echo showed ####    Patient is stable for discharge per attending and is advised to follow up with PCP as outpatient  Please refer to patient's complete medical chart with documents for a full hospital course, for this is only a brief summary.     Patient is a 85 yo, Hungarian speaking,  Female from home, with PMHx of DM, HTN, asthma, and HLD, who presented to the ED with chief complaints of fever and cough. Patient is a poor historian and history and HPI was obtained from granddaughter, Traci Kolb ([phone]) who reports that patient did not dry her hair after a long shower and was noted to have a temperature of 100.3 that night and developed symptoms of cough, chills and decreased appetite.    On admission in the ED, Leukocytosis, UA negative. COVID negative. CXR showed infiltrates. CT chest shows no PE but Right middle lobe pneumonia with possible mild involvement in the right upper lobe. She was started on Ceftriaxone and Azithromycin. Pt will be discharged on oral azithromycin and ceftin. Troponins were elevated. EKG showed sinus tachycardia.      Patient is stable for discharge per attending and is advised to follow up with PCP as outpatient  Please refer to patient's complete medical chart with documents for a full hospital course, for this is only a brief summary.

## 2020-12-03 NOTE — PROGRESS NOTE ADULT - PROBLEM SELECTOR PLAN 4
iv fluid
noted to have Hgb of 11.0 on admission  unknown baseline  no signs of bleeding noted  anemia panel showed decreased levels of iron, TIBC, Iron saturation % likely etiology iron deficiency anemia
noted to have Hgb of 11.0 on admission  unknown baseline  no signs of bleeding noted  f/u anemia panel  monitor CBC

## 2020-12-03 NOTE — PROGRESS NOTE ADULT - PROBLEM SELECTOR PLAN 2
o2   neb   o2   pulmonary on case
RML PNA noted on CT chest   given dose of CTX and zithro in ED  continue IV abx  pulm Dr. Lindsey consulted  monitor O2 sats  oxygen supplementation; taper off as tolerated   blood culture negative
RML PNA noted on CT chest   given dose of CTX and zithro in ED  continue IV abx  pulm Dr. Lindsey consulted  monitor O2 sats  oxygen supplementation; taper off as tolerated   f/u blood cx

## 2020-12-03 NOTE — CONSULT NOTE ADULT - PROBLEM SELECTOR RECOMMENDATION 9
Likely demand ischemia  Echocardiogram not necessary at this time Likely demand ischemia  f/u Echocardiogram

## 2020-12-03 NOTE — PHYSICAL THERAPY INITIAL EVALUATION ADULT - GENERAL OBSERVATIONS, REHAB EVAL
Pt. received supine in bed, NAD, on telemetry. Pt. cooperative and motivated during eval. Pt. speaks Lao and  phone was utilized ([employer] ID #[id number]) Pt. A&O x 3.

## 2020-12-03 NOTE — PROGRESS NOTE ADULT - SUBJECTIVE AND OBJECTIVE BOX
PGY-1 Progress Note discussed with attending    PAGER #: [[phone]] TILL 5:00 PM  PLEASE CONTACT ON CALL TEAM:  - On Call Team (Please refer to Jaylen) FROM 5:00 PM - 8:30PM  - Nightfloat Team FROM 8:30 -7:30 AM    CHIEF COMPLAINT & BRIEF HOSPITAL COURSE:    INTERVAL HPI/OVERNIGHT EVENTS:     REVIEW OF SYSTEMS:  CONSTITUTIONAL: No fever, weight loss, or fatigue  RESPIRATORY: No cough, wheezing, chills or hemoptysis; No shortness of breath  CARDIOVASCULAR: No chest pain, palpitations, dizziness, or leg swelling  GASTROINTESTINAL: No abdominal pain. No nausea, vomiting, or hematemesis; No diarrhea or constipation. No melena or hematochezia.  GENITOURINARY: No dysuria or hematuria, urinary frequency  NEUROLOGICAL: No headaches, memory loss, loss of strength, numbness, or tremors  SKIN: No itching, burning, rashes, or lesions     Vital Signs Last 24 Hrs  T(C): 36.4 (03 Dec 2020 08:13), Max: 37.2 (03 Dec 2020 05:28)  T(F): 97.5 (03 Dec 2020 08:13), Max: 98.9 (03 Dec 2020 05:28)  HR: 103 (03 Dec 2020 09:35) (73 - 105)  BP: 147/69 (03 Dec 2020 09:35) (106/66 - 161/75)  BP(mean): 90 (03 Dec 2020 09:35) (90 - 95)  RR: 18 (03 Dec 2020 08:13) (17 - 22)  SpO2: 97% (03 Dec 2020 09:35) (93% - 99%)    PHYSICAL EXAMINATION:  GENERAL: NAD, well built  HEAD:  Atraumatic, Normocephalic  EYES:  conjunctiva and sclera clear  NECK: Supple, No JVD, Normal thyroid  CHEST/LUNG: Clear to auscultation. Clear to percussion bilaterally; No rales, rhonchi, wheezing, or rubs  HEART: Regular rate and rhythm; No murmurs, rubs, or gallops  ABDOMEN: Soft, Nontender, Nondistended; Bowel sounds present  NERVOUS SYSTEM:  Alert & Oriented X3,    EXTREMITIES:  2+ Peripheral Pulses, No clubbing, cyanosis, or edema  SKIN: warm dry                          12.5   12.11 )-----------( 232      ( 03 Dec 2020 07:03 )             37.3     12-03    145  |  110<H>  |  15  ----------------------------<  153<H>  3.2<L>   |  25  |  0.82    Ca    8.9      03 Dec 2020 07:03  Phos  1.7     12-03  Mg     2.4     12-03    TPro  7.8  /  Alb  2.5<L>  /  TBili  0.4  /  DBili  x   /  AST  39  /  ALT  42  /  AlkPhos  126<H>  12-02    LIVER FUNCTIONS - ( 02 Dec 2020 05:54 )  Alb: 2.5 g/dL / Pro: 7.8 g/dL / ALK PHOS: 126 U/L / ALT: 42 U/L DA / AST: 39 U/L / GGT: x           CARDIAC MARKERS ( 02 Dec 2020 01:17 )  0.061 ng/mL / x     / x     / x     / x      CARDIAC MARKERS ( 01 Dec 2020 18:19 )  0.078 ng/mL / x     / x     / x     / x          PT/INR - ( 01 Dec 2020 18:19 )   PT: 12.4 sec;   INR: 1.04 ratio         PTT - ( 01 Dec 2020 18:19 )  PTT:33.8 sec    CAPILLARY BLOOD GLUCOSE      RADIOLOGY & ADDITIONAL TESTS:

## 2020-12-03 NOTE — PROGRESS NOTE ADULT - SUBJECTIVE AND OBJECTIVE BOX
INTERVAL HPI/OVERNIGHT EVENTS: doing better ,m decreased cough   MEDICATIONS  (STANDING):  aspirin enteric coated 81 milliGRAM(s) Oral daily  atorvastatin 40 milliGRAM(s) Oral at bedtime  azithromycin  IVPB 500 milliGRAM(s) IV Intermittent every 24 hours  cefTRIAXone   IVPB 1000 milliGRAM(s) IV Intermittent every 24 hours  guaiFENesin   Syrup  (Sugar-Free) 200 milliGRAM(s) Oral every 6 hours  heparin   Injectable 5000 Unit(s) SubCutaneous every 8 hours  insulin glargine Injectable (LANTUS) 5 Unit(s) SubCutaneous every morning  insulin lispro (ADMELOG) corrective regimen sliding scale   SubCutaneous Before meals and at bedtime  metoprolol tartrate 25 milliGRAM(s) Oral two times a day  potassium chloride    Tablet ER 40 milliEquivalent(s) Oral every 4 hours  sodium chloride 0.9%. 1000 milliLiter(s) (60 mL/Hr) IV Continuous <Continuous>    MEDICATIONS  (PRN):  acetaminophen   Tablet .. 650 milliGRAM(s) Oral every 6 hours PRN Temp greater or equal to 38C (100.4F), Moderate Pain (4 - 6)  ALBUTerol    90 MICROgram(s) HFA Inhaler 2 Puff(s) Inhalation every 6 hours PRN Shortness of Breath and/or Wheezing  ondansetron Injectable 4 milliGRAM(s) IV Push every 6 hours PRN Nausea and/or Vomiting    REVIEW OF SYSTEMS:  CONSTITUTIONAL: No fever, weight loss, or fatigue  RESPIRATORY: No cough, wheezing, chills or hemoptysis; No shortness of breath  CARDIOVASCULAR: No chest pain, palpitations, dizziness, or leg swelling  GASTROINTESTINAL: No abdominal pain. No nausea, vomiting, or hematemesis; No diarrhea or constipation. No melena or hematochezia.  GENITOURINARY: No dysuria or hematuria, urinary frequency  NEUROLOGICAL: No headaches, memory loss, loss of strength, numbness, or tremors  SKIN: No itching, burning, rashes, or lesions     Vital Signs Last 24 Hrs  T(C): 36.4 (03 Dec 2020 08:13), Max: 37.2 (03 Dec 2020 05:28)  T(F): 97.5 (03 Dec 2020 08:13), Max: 98.9 (03 Dec 2020 05:28)  HR: 103 (03 Dec 2020 09:35) (73 - 105)  BP: 147/69 (03 Dec 2020 09:35) (106/66 - 161/75)  BP(mean): 90 (03 Dec 2020 09:35) (90 - 95)  RR: 18 (03 Dec 2020 08:13) (17 - 22)  SpO2: 97% (03 Dec 2020 09:35) (93% - 99%)    PHYSICAL EXAMINATION:  GENERAL: NAD, well built  HEAD:  Atraumatic, Normocephalic  EYES:  conjunctiva and sclera clear  NECK: Supple, No JVD, Normal thyroid  CHEST/LUNG: Clear to auscultation. Clear to percussion bilaterally; No rales, rhonchi, wheezing, or rubs  HEART: Regular rate and rhythm; No murmurs, rubs, or gallops  ABDOMEN: Soft, Nontender, Nondistended; Bowel sounds present  NERVOUS SYSTEM:  Alert & Oriented X3,    EXTREMITIES:  2+ Peripheral Pulses, No clubbing, cyanosis, or edema  SKIN: warm dry                          12.5   12.11 )-----------( 232      ( 03 Dec 2020 07:03 )             37.3     12-03    145  |  110<H>  |  15  ----------------------------<  153<H>  3.2<L>   |  25  |  0.82    Ca    8.9      03 Dec 2020 07:03  Phos  1.7     12-03  Mg     2.4     12-03    TPro  7.8  /  Alb  2.5<L>  /  TBili  0.4  /  DBili  x   /  AST  39  /  ALT  42  /  AlkPhos  126<H>  12-02    LIVER FUNCTIONS - ( 02 Dec 2020 05:54 )  Alb: 2.5 g/dL / Pro: 7.8 g/dL / ALK PHOS: 126 U/L / ALT: 42 U/L DA / AST: 39 U/L / GGT: x           CARDIAC MARKERS ( 02 Dec 2020 01:17 )  0.061 ng/mL / x     / x     / x     / x      CARDIAC MARKERS ( 01 Dec 2020 18:19 )  0.078 ng/mL / x     / x     / x     / x          PT/INR - ( 01 Dec 2020 18:19 )   PT: 12.4 sec;   INR: 1.04 ratio         PTT - ( 01 Dec 2020 18:19 )  PTT:33.8 sec    CAPILLARY BLOOD GLUCOSE      RADIOLOGY & ADDITIONAL TESTS:

## 2020-12-03 NOTE — PHYSICAL THERAPY INITIAL EVALUATION ADULT - PERTINENT HX OF CURRENT PROBLEM, REHAB EVAL
alexia reviewed. Laboratory and Radiology results were also reviewed. Pt. admitted for cough amd fever. Pt. is Covid negative.

## 2020-12-03 NOTE — PROGRESS NOTE ADULT - PROBLEM SELECTOR PLAN 3
iv abx
noted to have creatinine of 1.47 on admission   unknown baseline  gentle IVF
noted to have creatinine of 1.47 on admission   unknown baseline  gentle IVF  f/u BMP in AM

## 2020-12-04 ENCOUNTER — TRANSCRIPTION ENCOUNTER (OUTPATIENT)
Age: 85
End: 2020-12-04

## 2020-12-04 VITALS
DIASTOLIC BLOOD PRESSURE: 57 MMHG | HEART RATE: 78 BPM | SYSTOLIC BLOOD PRESSURE: 133 MMHG | OXYGEN SATURATION: 96 % | TEMPERATURE: 98 F | RESPIRATION RATE: 18 BRPM

## 2020-12-04 LAB
ANION GAP SERPL CALC-SCNC: 9 MMOL/L — SIGNIFICANT CHANGE UP (ref 5–17)
BUN SERPL-MCNC: 14 MG/DL — SIGNIFICANT CHANGE UP (ref 7–18)
CALCIUM SERPL-MCNC: 7.9 MG/DL — LOW (ref 8.4–10.5)
CHLORIDE SERPL-SCNC: 109 MMOL/L — HIGH (ref 96–108)
CO2 SERPL-SCNC: 20 MMOL/L — LOW (ref 22–31)
CREAT SERPL-MCNC: 0.79 MG/DL — SIGNIFICANT CHANGE UP (ref 0.5–1.3)
GLUCOSE BLDC GLUCOMTR-MCNC: 128 MG/DL — HIGH (ref 70–99)
GLUCOSE BLDC GLUCOMTR-MCNC: 91 MG/DL — SIGNIFICANT CHANGE UP (ref 70–99)
GLUCOSE SERPL-MCNC: 99 MG/DL — SIGNIFICANT CHANGE UP (ref 70–99)
HCT VFR BLD CALC: 34.1 % — LOW (ref 34.5–45)
HGB BLD-MCNC: 11.3 G/DL — LOW (ref 11.5–15.5)
MAGNESIUM SERPL-MCNC: 2.1 MG/DL — SIGNIFICANT CHANGE UP (ref 1.6–2.6)
MCHC RBC-ENTMCNC: 30.8 PG — SIGNIFICANT CHANGE UP (ref 27–34)
MCHC RBC-ENTMCNC: 33.1 GM/DL — SIGNIFICANT CHANGE UP (ref 32–36)
MCV RBC AUTO: 92.9 FL — SIGNIFICANT CHANGE UP (ref 80–100)
NRBC # BLD: 0 /100 WBCS — SIGNIFICANT CHANGE UP (ref 0–0)
PHOSPHATE SERPL-MCNC: 2.2 MG/DL — LOW (ref 2.5–4.5)
PLATELET # BLD AUTO: 210 K/UL — SIGNIFICANT CHANGE UP (ref 150–400)
POTASSIUM SERPL-MCNC: 4.4 MMOL/L — SIGNIFICANT CHANGE UP (ref 3.5–5.3)
POTASSIUM SERPL-SCNC: 4.4 MMOL/L — SIGNIFICANT CHANGE UP (ref 3.5–5.3)
RBC # BLD: 3.67 M/UL — LOW (ref 3.8–5.2)
RBC # FLD: 13.5 % — SIGNIFICANT CHANGE UP (ref 10.3–14.5)
SODIUM SERPL-SCNC: 138 MMOL/L — SIGNIFICANT CHANGE UP (ref 135–145)
T3FREE SERPL-MCNC: 1.73 PG/ML — LOW (ref 1.8–4.6)
T4 FREE SERPL-MCNC: 1.5 NG/DL — SIGNIFICANT CHANGE UP (ref 0.9–1.8)
TSH SERPL-MCNC: 0.74 UU/ML — SIGNIFICANT CHANGE UP (ref 0.34–4.82)
WBC # BLD: 8.03 K/UL — SIGNIFICANT CHANGE UP (ref 3.8–10.5)
WBC # FLD AUTO: 8.03 K/UL — SIGNIFICANT CHANGE UP (ref 3.8–10.5)

## 2020-12-04 PROCEDURE — 82962 GLUCOSE BLOOD TEST: CPT

## 2020-12-04 PROCEDURE — 99291 CRITICAL CARE FIRST HOUR: CPT

## 2020-12-04 PROCEDURE — 84443 ASSAY THYROID STIM HORMONE: CPT

## 2020-12-04 PROCEDURE — 87086 URINE CULTURE/COLONY COUNT: CPT

## 2020-12-04 PROCEDURE — 0225U NFCT DS DNA&RNA 21 SARSCOV2: CPT

## 2020-12-04 PROCEDURE — 93306 TTE W/DOPPLER COMPLETE: CPT

## 2020-12-04 PROCEDURE — 96374 THER/PROPH/DIAG INJ IV PUSH: CPT

## 2020-12-04 PROCEDURE — 86140 C-REACTIVE PROTEIN: CPT

## 2020-12-04 PROCEDURE — 85610 PROTHROMBIN TIME: CPT

## 2020-12-04 PROCEDURE — 71045 X-RAY EXAM CHEST 1 VIEW: CPT

## 2020-12-04 PROCEDURE — 96375 TX/PRO/DX INJ NEW DRUG ADDON: CPT

## 2020-12-04 PROCEDURE — 85384 FIBRINOGEN ACTIVITY: CPT

## 2020-12-04 PROCEDURE — 82009 KETONE BODYS QUAL: CPT

## 2020-12-04 PROCEDURE — 81001 URINALYSIS AUTO W/SCOPE: CPT

## 2020-12-04 PROCEDURE — 80048 BASIC METABOLIC PNL TOTAL CA: CPT

## 2020-12-04 PROCEDURE — 84100 ASSAY OF PHOSPHORUS: CPT

## 2020-12-04 PROCEDURE — 84481 FREE ASSAY (FT-3): CPT

## 2020-12-04 PROCEDURE — 83615 LACTATE (LD) (LDH) ENZYME: CPT

## 2020-12-04 PROCEDURE — 85652 RBC SED RATE AUTOMATED: CPT

## 2020-12-04 PROCEDURE — 84439 ASSAY OF FREE THYROXINE: CPT

## 2020-12-04 PROCEDURE — 80061 LIPID PANEL: CPT

## 2020-12-04 PROCEDURE — 83880 ASSAY OF NATRIURETIC PEPTIDE: CPT

## 2020-12-04 PROCEDURE — 84478 ASSAY OF TRIGLYCERIDES: CPT

## 2020-12-04 PROCEDURE — 85027 COMPLETE CBC AUTOMATED: CPT

## 2020-12-04 PROCEDURE — 85379 FIBRIN DEGRADATION QUANT: CPT

## 2020-12-04 PROCEDURE — 97161 PT EVAL LOW COMPLEX 20 MIN: CPT

## 2020-12-04 PROCEDURE — 86769 SARS-COV-2 COVID-19 ANTIBODY: CPT

## 2020-12-04 PROCEDURE — 82803 BLOOD GASES ANY COMBINATION: CPT

## 2020-12-04 PROCEDURE — 36415 COLL VENOUS BLD VENIPUNCTURE: CPT

## 2020-12-04 PROCEDURE — 83690 ASSAY OF LIPASE: CPT

## 2020-12-04 PROCEDURE — 80053 COMPREHEN METABOLIC PANEL: CPT

## 2020-12-04 PROCEDURE — 82728 ASSAY OF FERRITIN: CPT

## 2020-12-04 PROCEDURE — 84145 PROCALCITONIN (PCT): CPT

## 2020-12-04 PROCEDURE — 87040 BLOOD CULTURE FOR BACTERIA: CPT

## 2020-12-04 PROCEDURE — 82306 VITAMIN D 25 HYDROXY: CPT

## 2020-12-04 PROCEDURE — 82746 ASSAY OF FOLIC ACID SERUM: CPT

## 2020-12-04 PROCEDURE — 83540 ASSAY OF IRON: CPT

## 2020-12-04 PROCEDURE — 83036 HEMOGLOBIN GLYCOSYLATED A1C: CPT

## 2020-12-04 PROCEDURE — 87635 SARS-COV-2 COVID-19 AMP PRB: CPT

## 2020-12-04 PROCEDURE — 82607 VITAMIN B-12: CPT

## 2020-12-04 PROCEDURE — 83605 ASSAY OF LACTIC ACID: CPT

## 2020-12-04 PROCEDURE — 85730 THROMBOPLASTIN TIME PARTIAL: CPT

## 2020-12-04 PROCEDURE — 93005 ELECTROCARDIOGRAM TRACING: CPT

## 2020-12-04 PROCEDURE — 84484 ASSAY OF TROPONIN QUANT: CPT

## 2020-12-04 PROCEDURE — 85025 COMPLETE CBC W/AUTO DIFF WBC: CPT

## 2020-12-04 PROCEDURE — 83735 ASSAY OF MAGNESIUM: CPT

## 2020-12-04 PROCEDURE — 83550 IRON BINDING TEST: CPT

## 2020-12-04 PROCEDURE — 71275 CT ANGIOGRAPHY CHEST: CPT

## 2020-12-04 PROCEDURE — 94640 AIRWAY INHALATION TREATMENT: CPT

## 2020-12-04 RX ORDER — SODIUM,POTASSIUM PHOSPHATES 278-250MG
1 POWDER IN PACKET (EA) ORAL ONCE
Refills: 0 | Status: COMPLETED | OUTPATIENT
Start: 2020-12-04 | End: 2020-12-04

## 2020-12-04 RX ADMIN — INSULIN GLARGINE 5 UNIT(S): 100 INJECTION, SOLUTION SUBCUTANEOUS at 08:41

## 2020-12-04 RX ADMIN — Medication 200 MILLIGRAM(S): at 11:19

## 2020-12-04 RX ADMIN — AZITHROMYCIN 255 MILLIGRAM(S): 500 TABLET, FILM COATED ORAL at 10:02

## 2020-12-04 RX ADMIN — Medication 1 TABLET(S): at 10:02

## 2020-12-04 RX ADMIN — Medication 200 MILLIGRAM(S): at 00:23

## 2020-12-04 RX ADMIN — HEPARIN SODIUM 5000 UNIT(S): 5000 INJECTION INTRAVENOUS; SUBCUTANEOUS at 05:57

## 2020-12-04 RX ADMIN — Medication 200 MILLIGRAM(S): at 05:57

## 2020-12-04 RX ADMIN — CEFTRIAXONE 100 MILLIGRAM(S): 500 INJECTION, POWDER, FOR SOLUTION INTRAMUSCULAR; INTRAVENOUS at 09:21

## 2020-12-04 RX ADMIN — Medication 25 MILLIGRAM(S): at 05:57

## 2020-12-04 RX ADMIN — Medication 81 MILLIGRAM(S): at 11:19

## 2020-12-04 NOTE — PROGRESS NOTE ADULT - SUBJECTIVE AND OBJECTIVE BOX
Patient is seen and examined at the bed side, is afebrile.        REVIEW OF SYSTEMS: All other review systems are negative      ALLERGIES: No Known Allergies      Vital Signs Last 24 Hrs  T(C): 36.9 (04 Dec 2020 11:07), Max: 37.2 (03 Dec 2020 19:47)  T(F): 98.5 (04 Dec 2020 11:07), Max: 98.9 (03 Dec 2020 19:47)  HR: 78 (04 Dec 2020 11:07) (74 - 83)  BP: 133/57 (04 Dec 2020 11:07) (129/56 - 136/55)  BP(mean): --  RR: 18 (04 Dec 2020 11:07) (16 - 18)  SpO2: 96% (04 Dec 2020 11:07) (96% - 100%)        PHYSICAL EXAM:  GENERAL: Not in distress   CHEST/LUNG: Not using accessory muscles   HEART: s1 and s2 present  ABDOMEN:  Nontender and  Nondistended  EXTREMITIES: No pedal  edema  CNS: Awake and Alert        LABS:                        11.3   8.03  )-----------( 210      ( 04 Dec 2020 06:17 )             34.1                           12.5   12.11 )-----------( 232      ( 03 Dec 2020 07:03 )             37.3         12-04    138  |  109<H>  |  14  ----------------------------<  99  4.4   |  20<L>  |  0.79    Ca    7.9<L>      04 Dec 2020 06:17  Phos  2.2     12-04  Mg     2.1     -      12-03    145  |  110<H>  |  15  ----------------------------<  153<H>  3.2<L>   |  25  |  0.82    Ca    8.9      03 Dec 2020 07:03  Phos  1.7     12-03  Mg     2.4     12-03    TPro  7.8  /  Alb  2.5<L>  /  TBili  0.4  /  DBili  x   /  AST  39  /  ALT  42  /  AlkPhos  126<H>  1202    PT/INR - ( 01 Dec 2020 18:19 )   PT: 12.4 sec;   INR: 1.04 ratio     PTT - ( 01 Dec 2020 18:19 )  PTT:33.8 sec        CAPILLARY BLOOD GLUCOSE  POCT Blood Glucose.: 151 mg/dL (03 Dec 2020 07:45)  POCT Blood Glucose.: 141 mg/dL (02 Dec 2020 22:31)  POCT Blood Glucose.: 151 mg/dL (02 Dec 2020 17:28)  POCT Blood Glucose.: 272 mg/dL (02 Dec 2020 11:08)        Urinalysis Basic - ( 01 Dec 2020 18:41 )  Color: Yellow / Appearance: Clear / S.015 / pH: x  Gluc: x / Ketone: Negative  / Bili: Negative / Urobili: Negative   Blood: x / Protein: 30 mg/dL / Nitrite: Negative   Leuk Esterase: Trace / RBC: 2-5 /HPF / WBC 3-5 /HPF   Sq Epi: x / Non Sq Epi: Occasional /HPF / Bacteria: Trace /HPF        MEDICATIONS  (STANDING):    aspirin enteric coated 81 milliGRAM(s) Oral daily  atorvastatin 40 milliGRAM(s) Oral at bedtime  azithromycin  IVPB 500 milliGRAM(s) IV Intermittent every 24 hours  cefTRIAXone   IVPB 1000 milliGRAM(s) IV Intermittent every 24 hours  guaiFENesin   Syrup  (Sugar-Free) 200 milliGRAM(s) Oral every 6 hours  heparin   Injectable 5000 Unit(s) SubCutaneous every 8 hours  insulin lispro (ADMELOG) corrective regimen sliding scale   SubCutaneous Before meals and at bedtime  metoprolol tartrate 25 milliGRAM(s) Oral two times a day  sodium chloride 0.9%. 1000 milliLiter(s) (60 mL/Hr) IV Continuous <Continuous>          RADIOLOGY & ADDITIONAL TESTS:    20: CT Angio Chest w/ IV Cont (20 @ 22:01) Right middle lobe pneumonia with possible mild involvement in the right upper lobe.  A follow-up chest CT is recommended in one month afterantibiotic therapy and resolution of acute symptoms to ensure resolution of the imaging findings.    Partially visualized, there is nonspecific fat stranding/edema between the tail the pancreas and spleen, associated with thickening of the left anterior renal fascia.  Pancreatitis should be excluded based on clinical symptoms and laboratory values.    Small pleural effusions bilaterally. No pulmonary embolism.    20: Xray Chest 1 View-PORTABLE IMMEDIATE (20 @ 17:19) Interstitial prominence on the basis of pulmonary edema or interstitial pneumonia.  Mild cardiomegaly.        MICROBIOLOGY DATA:        COVID-19 PCR . (20 @ 11:57)   COVID-19 PCR: NotDetec:     COVID-19 Antibody - for prior infection screening (20 @ 09:29)   COVID-19 IgG Antibody Index: <0.10: Abbott CMIA   Negative Result <= 1.39 Index   Positive Result >= 1.40 Index Index   COVID-19 IgG Antibody Interpretation: Negative:    Culture - Urine (20 @ 22:13)   Specimen Source: .Urine Clean Catch (Midstream)   Culture Results:  <10,000 CFU/mL Normal Urogenital Leslie     Culture - Blood (20 @ 22:06)   Specimen Source: .Blood Blood-Peripheral   Culture Results: No growth to date.     Culture - Blood (20 @ 22:06)   Specimen Source: .Blood Blood-Peripheral   Culture Results: No growth to date.

## 2020-12-04 NOTE — PROGRESS NOTE ADULT - REASON FOR ADMISSION
fever and cough

## 2020-12-04 NOTE — PROGRESS NOTE ADULT - ATTENDING COMMENTS
I have examined pt personally Hx chart lab and xrays reviewed and pt discussed with residents
I have examined pt personally Hx chart lab and xrays reviewed and pt discussed with residents
continue other care   id , cardiology and pulmonary on case

## 2020-12-04 NOTE — PROGRESS NOTE ADULT - ASSESSMENT
Asthmatic Bronchitis/ Pneumonia RLL   IDDM  Htn   COVID -- Negative       PLAN- Repeat CXR am    IV rocephin and zithro-- Po levaquin if cxr better in am   symbicort 80/4.5 2 puffs bid   Ventolin 2 puffs q4h prn   s/c Lovenox  OOB    BRP  
Asthmatic Bronchitis/ Pneumonia RUL and RML   IDDM  Htn   COVID -- Negative       PLAN-    urine legionella  IV rocephin and zithro   symbicort 80/4.5 2 puffs bid   Ventolin 2 puffs q4h prn   s/c Lovenox  OOB in chair   BRP  F/U CXR
Patient is a 84y old  Female from home, with PMH of DM, HTN, asthma, and HLD, who presented to the ER for evaluation of fever, chills and cough.  As per granddaughter, patient had taken a shower on Friday and had taken longer than usual in the shower and also did not dry her hair after some time and was noted to feel warm later that night. Temperature was taken and noted to be 100.3 and patient also started to develop a cough and chills. Patient also has been having decreased appetite. ON admission, she found to have tachycardia, tachypnea and Leukocytosis. The CT chest shows no PE but Right middle lobe pneumonia with possible mild involvement in the right upper lobe. She has started on Ceftriaxone and Azithromycin, and the ID consult requested to assist with further evaluation and antibiotic management.    # Sepsis ( Tachycardia + Tachypnea + Leukocytosis)  # Right sided pneumonia  # COVID PCR negative X z    would recommend:    1. Please obtain Nasal swab for MRSA PCR   3. Legionella urine Ag  4. Continue Ceftriaxone and Azithromycin until work up is done  5. Monitor WBC count  and QTc while on Azithromycin   6. Supplemental oxygenation and bronchodilator as needed    d/w Patient     Attending Attestation:    Spent more than 45 minutes on total encounter, more than 50 % of the visit was spent counseling and/or coordinating care by the Attending physician.
Patient is an 84 year old, Bulgarian speaking, female from home, with PMHx of DM, HTN, asthma, and HLD, presented to the ED due to fever, chills and cough of x5 days duration. Admitted for PNA.
Patient is an 84 year old female from home, with PMH of DM, HTN, asthma, and HLD, who presented to the ED due to fever, chills and cough. Admitted for COVID-19 PNA

## 2020-12-04 NOTE — PROGRESS NOTE ADULT - SUBJECTIVE AND OBJECTIVE BOX
PULMONARY  progress note    SUSANNAH HUTCHISON  MRN-[id number]    Patient is a 84y old  Female who presents with a chief complaint of fever and cough (03 Dec 2020 23:18)  Feels better, anxious to go home      MEDICATIONS  (STANDING):  aspirin enteric coated 81 milliGRAM(s) Oral daily  atorvastatin 40 milliGRAM(s) Oral at bedtime  azithromycin  IVPB 500 milliGRAM(s) IV Intermittent every 24 hours  cefTRIAXone   IVPB 1000 milliGRAM(s) IV Intermittent every 24 hours  guaiFENesin   Syrup  (Sugar-Free) 200 milliGRAM(s) Oral every 6 hours  heparin   Injectable 5000 Unit(s) SubCutaneous every 8 hours  insulin lispro (ADMELOG) corrective regimen sliding scale   SubCutaneous Before meals and at bedtime  metoprolol tartrate 25 milliGRAM(s) Oral two times a day  sodium chloride 0.9%. 1000 milliLiter(s) (60 mL/Hr) IV Continuous <Continuous>      MEDICATIONS  (PRN):  acetaminophen   Tablet .. 650 milliGRAM(s) Oral every 6 hours PRN Temp greater or equal to 38C (100.4F), Moderate Pain (4 - 6)  ALBUTerol    90 MICROgram(s) HFA Inhaler 2 Puff(s) Inhalation every 6 hours PRN Shortness of Breath and/or Wheezing  ondansetron Injectable 4 milliGRAM(s) IV Push every 6 hours PRN Nausea and/or Vomiting      Allergies    No Known Allergies          PAST MEDICAL & SURGICAL HISTORY:  Asthma    HLD (hyperlipidemia)    HTN (hypertension)    Diabetes mellitus             REVIEW OF SYSTEMS:  CONSTITUTIONAL: No fever, weight loss, or fatigue   EYES: No eye pain, visual disturbances, or discharge  ENT:  No difficulty hearing, tinnitus, vertigo; No sinus or throat pain  NECK: No pain or stiffness or nodes  RESPIRATORY:  cough +  wheezing --  chills --  hemoptysis -- Shortness of Breath--  CARDIOVASCULAR: No chest pain, palpitations, passing out, dizziness, or leg swelling  GASTROINTESTINAL: No abdominal or epigastric pain. No nausea, vomiting, or hematemesis; No diarrhea or constipation. No melena or hematochezia.  GENITOURINARY: No dysuria, frequency, hematuria, or incontinence  NEUROLOGICAL: No headaches, memory loss, loss of strength, numbness, or tremors  SKIN: No itching, burning, rashes, or lesions   LYMPH Nodes: No enlarged glands  ENDOCRINE: No heat or cold intolerance; No hair loss  HEME/LYMPH: No easy bruising, or bleeding gums  ALLERGY AND IMMUNOLOGIC: No hives or eczema    Vital Signs Last 24 Hrs  T(C): 36.8 (04 Dec 2020 07:49), Max: 37.2 (03 Dec 2020 19:47)  T(F): 98.2 (04 Dec 2020 07:49), Max: 98.9 (03 Dec 2020 19:47)  HR: 74 (04 Dec 2020 07:49) (74 - 97)  BP: 135/54 (04 Dec 2020 07:49) (129/56 - 150/66)  BP(mean): --  RR: 17 (04 Dec 2020 07:49) (16 - 18)  SpO2: 98% (04 Dec 2020 07:49) (97% - 100%)  I&O's Detail      PHYSICAL EXAMINATION:    GENERAL: The patient is a thin female in no apparent distress.   SKIN no rash ecchymoses or bruises  HEENT: Head is normocephalic and atraumatic  NICCI , Extraocular muscles are intact. Mucous membranes  moist.   Neck supple ,No LN felt JVP not increased  Thyroid not enlarged  Cardiovascular:  S1 S2 heard, RSR, No JVD , systolic  murmur at apex, No gallop or rub  Respiratory: Chest wall symmetrical with good air entry ,Percussion note normal,    Lungs vesicular breathing with rt basilar   rales , no   wheeze	  ABDOMEN:  Soft, Non-tender,  no hepatomegaly or splenomegaly BS positive	  Extremities: Normal range of motion, No clubbing, cyanosis or edema  Vascular: Peripheral pulses palpable 2+ bilaterally  CNS:  Alert and oriented x3   Cranial nerves intact  sensory intact  motor power5/5  dtr 2+   Babinski neg    LABS:                        11.3   8.03  )-----------( 210      ( 04 Dec 2020 06:17 )             34.1     12-04    138  |  109<H>  |  14  ----------------------------<  99  4.4   |  20<L>  |  0.79    Ca    7.9<L>      04 Dec 2020 06:17  Phos  2.2     12-04  Mg     2.1     12-0    Serum Pro-Brain Natriuretic Peptide: 00983 pg/mL (12-01-20 @ 18:19)    Procalcitonin, Serum: 7.37 ng/mL (12-02-20 @ 04:00)     TSH- 0.74    Ferritin, Serum: 268 ng/mL (12-02-20 @ 09:39)  Folate, Serum: >20.0 ng/mL (12-02-20 @ 09:39)  Vitamin B12, Serum: >2000 pg/mL (12-02-20 @ 09:39)  Ferritin, Serum: 228 ng/mL (12-02-20 @ 04:00)      MICROBIOLOGY:    Culture - Urine (collected 12-01-20 @ 22:13)  Source: .Urine Clean Catch (Midstream)  Final Report (12-02-20 @ 20:44):    <10,000 CFU/mL Normal Urogenital Leslie    Culture - Blood (collected 12-01-20 @ 22:06)  Source: .Blood Blood-Peripheral  Preliminary Report (12-02-20 @ 23:01):    No growth to date.    Culture - Blood (collected 12-01-20 @ 22:06)  Source: .Blood Blood-Peripheral  Preliminary Report (12-02-20 @ 23:01):    No growth to date.          RADIOLOGY & ADDITIONAL STUDIES:    CXR: Patchy infiltrate RLL  - 12/3/20  under penetrated film

## 2020-12-05 RX ORDER — CEFUROXIME AXETIL 250 MG
1 TABLET ORAL
Qty: 6 | Refills: 0
Start: 2020-12-05 | End: 2020-12-07

## 2020-12-05 RX ORDER — AZITHROMYCIN 500 MG/1
1 TABLET, FILM COATED ORAL
Qty: 1 | Refills: 0
Start: 2020-12-05 | End: 2020-12-05

## 2020-12-06 LAB
CULTURE RESULTS: SIGNIFICANT CHANGE UP
CULTURE RESULTS: SIGNIFICANT CHANGE UP
SPECIMEN SOURCE: SIGNIFICANT CHANGE UP
SPECIMEN SOURCE: SIGNIFICANT CHANGE UP

## 2022-05-06 NOTE — PATIENT PROFILE ADULT - INFLUENZA IMMUNIZATION DATE (APPROXIMATE)
Chief Complaint   Patient presents with   • Follow-up     diabetes-Concerns with hearing. Denies rining or buzzing in the ears. bilateral calf cramps after exercise        SUBJECTIVE:  Orlin Samuel is a 50 year old male presenting for follow-up of multiple medical issues.  Patient does have history of diabetes, hyperlipidemia.  He did have blood work done in April but he states that he has done some changes since then he did go up on his glimepiride and he does state he was also on vacation where he actually ate better and lost 5 lb.  He has also been checking his blood sugars more over the past couple of weeks they have come down quite significantly down into the 140s and 150s verses the 200s that he was having before.  He is trying to stay active.  We did discuss alcohol intake he has had rare alcohol intake over the past few weeks.  He does state that a few times a month he does get together with his diet friends and they do duron together.  He does not think that this is a problem at this time but he just wanted to talk about it.  I did discuss with him continuing to monitor this as long as he being very careful and he does feel as though he is.  He currently denies any lightheadedness, dizziness, shortness of breath or chest discomfort.  No extremity edema.  No difficulties with urination or bowels.    Did review health maintenance recommendations.  He does state that he may have some concerns for hearing mostly a significant other has concerns he would consider audiology referral but is unsure if he wants to do that as of yet.    PAST MEDICAL HISTORY:    Diabetes type 2, controlled (CMS/HCC)           2019          Obesity (BMI 30-39.9)                                         High cholesterol                                              CUBA on CPAP                                     8/31/2017     Patient Active Problem List   Diagnosis   • CBUA (obstructive sleep apnea)   • Other hyperlipidemia   • Type 2  diabetes mellitus with microalbuminuria, without long-term current use of insulin (CMS/[facility])   • Obesity (BMI 30-39.9)   • Type 2 diabetes mellitus with right eye affected by mild nonproliferative retinopathy without macular edema, without long-term current use of insulin (CMS/[facility])   • Class 2 severe obesity due to excess calories with serious comorbidity and body mass index (BMI) of 37.0 to 37.9 in adult (CMS/[facility])       Past Surgical History:   Procedure Laterality Date   • Colonoscopy w/ biopsies  05/12/2021    Dr Hart.  f/u 2031   • Oral surgery procedure      [facility] Teeth Extraction       Current Outpatient Medications   Medication Sig Dispense Refill   • metformin (GLUCOPHAGE) 1000 MG tablet Take 1 tablet by mouth 2 times daily (with meals). 180 tablet 0   • blood glucose (OneTouch Verio) test strip Test blood sugar 2 times daily. Diagnosis: E11.9. Meter: one touch verio 200 strip 3   • blood glucose meter Test blood sugar 2 times daily. Diagnosis: e11.9. Meter: insurance covered 1 kit 0   • blood glucose lancets Test blood sugar 2 times daily. Diagnosis: E11.9. Meter: insurance covered 100 each 11   • glimepiride (AMARYL) 4 MG tablet Take 1 tablet by mouth 2 times daily (before meals). 180 tablet 3   • metformin (GLUCOPHAGE) 1000 MG tablet Take 1 tablet by mouth 2 times daily (with meals). 180 tablet 3     No current facility-administered medications for this visit.       ALLERGIES:  No Known Allergies    Social History     Tobacco Use   • Smoking status: Never Smoker   • Smokeless tobacco: Never Used   Vaping Use   • Vaping Use: never used   Substance Use Topics   • Alcohol use: Yes     Alcohol/week: 1.0 standard drink     Types: 1 Standard drinks or equivalent per week     Comment: rarely   • Drug use: No        Family History   Problem Relation Age of Onset   • Diabetes Mother    • Hyperlipidemia Father    • Cataracts Father    • Ulcerative Colitis Brother         intestine removed (unsure small or large)         OBJECTIVE:  Vital Signs:   Visit Vitals  /70   Pulse 72   Ht 5' 10\" (1.778 m)   Wt 114.2 kg (251 lb 12.3 oz)   SpO2 98%   BMI 36.12 kg/m²     General: Patient appears well.  HEENT: Conjunctivae are neither pale nor injected. Mucous membranes are moist. Nares are clear. TMs (Tympanic membranes) and canals are normal. Oropharynx is moist without erythema, exudate, or petechiae.  Neck: Supple without anterior cervical or supraclavicular adenopathy.  No thyromegaly.  Lungs: Clear to auscultation bilaterally without crackles or wheezes. Respirations are even and unlabored.  Heart: Regular rate and rhythm without murmurs, rubs, or gallops.  Skin: Warm and dry without rashes.  Extremities: No cyanosis, clubbing, or edema.  Distal pulses are palpable, Homans is negative, no calf tenderness at this time.  Neurologic: Alert and oriented times 3.     Did discuss laboratory testing from 04/04/2022    ASSESSMENT AND PLAN:    Orlin was seen today for follow-up.    Diagnoses and all orders for this visit:    Type 2 diabetes mellitus with microalbuminuria, without long-term current use of insulin (CMS/[facility])  -     MICROALBUMIN URINE RANDOM; Future  -     COMPREHENSIVE METABOLIC PANEL; Future  -     glimepiride (AMARYL) 4 MG tablet; Take 1 tablet by mouth 2 times daily (before meals).  -     metformin (GLUCOPHAGE) 1000 MG tablet; Take 1 tablet by mouth 2 times daily (with meals).  -     GLYCOHEMOGLOBIN; Future  -     LIPID PANEL WITH REFLEX; Future  -     CBC WITH DIFFERENTIAL; Future  -     FERRITIN; Future  -     IRON AND TOTAL IRON BINDING CAPACITY; Future    Other hyperlipidemia    CUBA (obstructive sleep apnea)    Leg cramps       Did discuss all findings with the patient.  He also states that in the visit that he was having some leg cramps at times he did have concern for possible blood clot however we did re-evaluate his leg see above.  He would like to give the increase of glimepiride a little bit longer.  We did  discuss also the possibility of increasing metformin.  He will work on still lifestyle modifications and continue medications as directed.  He will follow-up in 3 months with labs prior.  We did also discuss the possibility of checking for his iron and ferritin levels as his hemoglobin is always on the low side of normal.  He is in agreement with this.  He will continue to work on his blood sugars and I would like him to try to get down below 8 as far as his A1c.  All his questions are answered.  Patient is in agreement plan.   03-Aug-2020

## 2022-07-25 NOTE — PHYSICAL THERAPY INITIAL EVALUATION ADULT - ASSISTIVE DEVICE FOR STAIR TRANSFER, REHAB EVAL
unilateral rail Bexarotene Counseling:  I discussed with the patient the risks of bexarotene including but not limited to hair loss, dry lips/skin/eyes, liver abnormalities, hyperlipidemia, pancreatitis, depression/suicidal ideation, photosensitivity, drug rash/allergic reactions, hypothyroidism, anemia, leukopenia, infection, cataracts, and teratogenicity.  Patient understands that they will need regular blood tests to check lipid profile, liver function tests, white blood cell count, thyroid function tests and pregnancy test if applicable.

## 2023-09-13 ENCOUNTER — TRANSCRIPTION ENCOUNTER (OUTPATIENT)
Age: 88
End: 2023-09-13

## 2023-09-14 ENCOUNTER — INPATIENT (INPATIENT)
Facility: HOSPITAL | Age: 88
LOS: 3 days | Discharge: HOME HEALTH | End: 2023-09-18
Attending: ORTHOPAEDIC SURGERY | Admitting: ORTHOPAEDIC SURGERY
Payer: MEDICAID

## 2023-09-14 VITALS
HEART RATE: 87 BPM | SYSTOLIC BLOOD PRESSURE: 134 MMHG | OXYGEN SATURATION: 97 % | RESPIRATION RATE: 18 BRPM | TEMPERATURE: 98 F | DIASTOLIC BLOOD PRESSURE: 56 MMHG

## 2023-09-14 DIAGNOSIS — Z01.818 ENCOUNTER FOR OTHER PREPROCEDURAL EXAMINATION: ICD-10-CM

## 2023-09-14 DIAGNOSIS — E11.42 TYPE 2 DIABETES MELLITUS WITH DIABETIC POLYNEUROPATHY: ICD-10-CM

## 2023-09-14 DIAGNOSIS — E78.5 HYPERLIPIDEMIA, UNSPECIFIED: ICD-10-CM

## 2023-09-14 DIAGNOSIS — Z29.9 ENCOUNTER FOR PROPHYLACTIC MEASURES, UNSPECIFIED: ICD-10-CM

## 2023-09-14 DIAGNOSIS — D63.8 ANEMIA IN OTHER CHRONIC DISEASES CLASSIFIED ELSEWHERE: ICD-10-CM

## 2023-09-14 DIAGNOSIS — M25.552 PAIN IN LEFT HIP: ICD-10-CM

## 2023-09-14 DIAGNOSIS — I10 ESSENTIAL (PRIMARY) HYPERTENSION: ICD-10-CM

## 2023-09-14 DIAGNOSIS — S72.009A FRACTURE OF UNSPECIFIED PART OF NECK OF UNSPECIFIED FEMUR, INITIAL ENCOUNTER FOR CLOSED FRACTURE: ICD-10-CM

## 2023-09-14 PROBLEM — E11.9 TYPE 2 DIABETES MELLITUS WITHOUT COMPLICATIONS: Chronic | Status: ACTIVE | Noted: 2020-12-02

## 2023-09-14 PROBLEM — J45.909 UNSPECIFIED ASTHMA, UNCOMPLICATED: Chronic | Status: ACTIVE | Noted: 2020-12-02

## 2023-09-14 LAB
24R-OH-CALCIDIOL SERPL-MCNC: 46 NG/ML — SIGNIFICANT CHANGE UP (ref 30–80)
A1C WITH ESTIMATED AVERAGE GLUCOSE RESULT: 5.8 % — HIGH (ref 4–5.6)
ALBUMIN SERPL ELPH-MCNC: 3.8 G/DL — SIGNIFICANT CHANGE UP (ref 3.3–5)
ALBUMIN SERPL ELPH-MCNC: 3.9 G/DL — SIGNIFICANT CHANGE UP (ref 3.3–5)
ALP SERPL-CCNC: 71 U/L — SIGNIFICANT CHANGE UP (ref 40–120)
ALT FLD-CCNC: 16 U/L — SIGNIFICANT CHANGE UP (ref 4–33)
ANION GAP SERPL CALC-SCNC: 16 MMOL/L — HIGH (ref 7–14)
ANION GAP SERPL CALC-SCNC: 8 MMOL/L — SIGNIFICANT CHANGE UP (ref 7–14)
APTT BLD: 28.5 SEC — SIGNIFICANT CHANGE UP (ref 24.5–35.6)
AST SERPL-CCNC: 24 U/L — SIGNIFICANT CHANGE UP (ref 4–32)
BASOPHILS # BLD AUTO: 0.03 K/UL — SIGNIFICANT CHANGE UP (ref 0–0.2)
BASOPHILS NFR BLD AUTO: 0.3 % — SIGNIFICANT CHANGE UP (ref 0–2)
BILIRUB SERPL-MCNC: 0.6 MG/DL — SIGNIFICANT CHANGE UP (ref 0.2–1.2)
BLD GP AB SCN SERPL QL: NEGATIVE — SIGNIFICANT CHANGE UP
BUN SERPL-MCNC: 11 MG/DL — SIGNIFICANT CHANGE UP (ref 7–23)
BUN SERPL-MCNC: 17 MG/DL — SIGNIFICANT CHANGE UP (ref 7–23)
CALCIUM SERPL-MCNC: 10.1 MG/DL — SIGNIFICANT CHANGE UP (ref 8.4–10.5)
CALCIUM SERPL-MCNC: 8.1 MG/DL — LOW (ref 8.4–10.5)
CHLORIDE SERPL-SCNC: 103 MMOL/L — SIGNIFICANT CHANGE UP (ref 98–107)
CHLORIDE SERPL-SCNC: 97 MMOL/L — LOW (ref 98–107)
CO2 SERPL-SCNC: 21 MMOL/L — LOW (ref 22–31)
CO2 SERPL-SCNC: 25 MMOL/L — SIGNIFICANT CHANGE UP (ref 22–31)
CREAT SERPL-MCNC: 0.82 MG/DL — SIGNIFICANT CHANGE UP (ref 0.5–1.3)
CREAT SERPL-MCNC: 0.86 MG/DL — SIGNIFICANT CHANGE UP (ref 0.5–1.3)
EGFR: 65 ML/MIN/1.73M2 — SIGNIFICANT CHANGE UP
EGFR: 69 ML/MIN/1.73M2 — SIGNIFICANT CHANGE UP
EOSINOPHIL # BLD AUTO: 0 K/UL — SIGNIFICANT CHANGE UP (ref 0–0.5)
EOSINOPHIL NFR BLD AUTO: 0 % — SIGNIFICANT CHANGE UP (ref 0–6)
ESTIMATED AVERAGE GLUCOSE: 120 — SIGNIFICANT CHANGE UP
GLUCOSE BLDC GLUCOMTR-MCNC: 127 MG/DL — HIGH (ref 70–99)
GLUCOSE BLDC GLUCOMTR-MCNC: 133 MG/DL — HIGH (ref 70–99)
GLUCOSE BLDC GLUCOMTR-MCNC: 136 MG/DL — HIGH (ref 70–99)
GLUCOSE BLDC GLUCOMTR-MCNC: 167 MG/DL — HIGH (ref 70–99)
GLUCOSE SERPL-MCNC: 178 MG/DL — HIGH (ref 70–99)
GLUCOSE SERPL-MCNC: 179 MG/DL — HIGH (ref 70–99)
HCT VFR BLD CALC: 28 % — LOW (ref 34.5–45)
HCT VFR BLD CALC: 33.7 % — LOW (ref 34.5–45)
HGB BLD-MCNC: 11.7 G/DL — SIGNIFICANT CHANGE UP (ref 11.5–15.5)
HGB BLD-MCNC: 9.6 G/DL — LOW (ref 11.5–15.5)
IANC: 9.03 K/UL — HIGH (ref 1.8–7.4)
IMM GRANULOCYTES NFR BLD AUTO: 0.3 % — SIGNIFICANT CHANGE UP (ref 0–0.9)
INR BLD: 1.04 RATIO — SIGNIFICANT CHANGE UP (ref 0.85–1.18)
LYMPHOCYTES # BLD AUTO: 0.88 K/UL — LOW (ref 1–3.3)
LYMPHOCYTES # BLD AUTO: 8.3 % — LOW (ref 13–44)
MCHC RBC-ENTMCNC: 31.4 PG — SIGNIFICANT CHANGE UP (ref 27–34)
MCHC RBC-ENTMCNC: 31.7 PG — SIGNIFICANT CHANGE UP (ref 27–34)
MCHC RBC-ENTMCNC: 34.3 GM/DL — SIGNIFICANT CHANGE UP (ref 32–36)
MCHC RBC-ENTMCNC: 34.7 GM/DL — SIGNIFICANT CHANGE UP (ref 32–36)
MCV RBC AUTO: 90.3 FL — SIGNIFICANT CHANGE UP (ref 80–100)
MCV RBC AUTO: 92.4 FL — SIGNIFICANT CHANGE UP (ref 80–100)
MONOCYTES # BLD AUTO: 0.58 K/UL — SIGNIFICANT CHANGE UP (ref 0–0.9)
MONOCYTES NFR BLD AUTO: 5.5 % — SIGNIFICANT CHANGE UP (ref 2–14)
NEUTROPHILS # BLD AUTO: 9.03 K/UL — HIGH (ref 1.8–7.4)
NEUTROPHILS NFR BLD AUTO: 85.6 % — HIGH (ref 43–77)
NRBC # BLD: 0 /100 WBCS — SIGNIFICANT CHANGE UP (ref 0–0)
NRBC # BLD: 0 /100 WBCS — SIGNIFICANT CHANGE UP (ref 0–0)
NRBC # FLD: 0 K/UL — SIGNIFICANT CHANGE UP (ref 0–0)
NRBC # FLD: 0 K/UL — SIGNIFICANT CHANGE UP (ref 0–0)
PLATELET # BLD AUTO: 165 K/UL — SIGNIFICANT CHANGE UP (ref 150–400)
PLATELET # BLD AUTO: 213 K/UL — SIGNIFICANT CHANGE UP (ref 150–400)
POTASSIUM SERPL-MCNC: 3.9 MMOL/L — SIGNIFICANT CHANGE UP (ref 3.5–5.3)
POTASSIUM SERPL-MCNC: 4.2 MMOL/L — SIGNIFICANT CHANGE UP (ref 3.5–5.3)
POTASSIUM SERPL-SCNC: 3.9 MMOL/L — SIGNIFICANT CHANGE UP (ref 3.5–5.3)
POTASSIUM SERPL-SCNC: 4.2 MMOL/L — SIGNIFICANT CHANGE UP (ref 3.5–5.3)
PROT SERPL-MCNC: 7 G/DL — SIGNIFICANT CHANGE UP (ref 6–8.3)
PROTHROM AB SERPL-ACNC: 11.6 SEC — SIGNIFICANT CHANGE UP (ref 9.5–13)
RBC # BLD: 3.03 M/UL — LOW (ref 3.8–5.2)
RBC # BLD: 3.73 M/UL — LOW (ref 3.8–5.2)
RBC # FLD: 12.9 % — SIGNIFICANT CHANGE UP (ref 10.3–14.5)
RBC # FLD: 12.9 % — SIGNIFICANT CHANGE UP (ref 10.3–14.5)
RH IG SCN BLD-IMP: POSITIVE — SIGNIFICANT CHANGE UP
RH IG SCN BLD-IMP: POSITIVE — SIGNIFICANT CHANGE UP
SODIUM SERPL-SCNC: 134 MMOL/L — LOW (ref 135–145)
SODIUM SERPL-SCNC: 136 MMOL/L — SIGNIFICANT CHANGE UP (ref 135–145)
WBC # BLD: 10.15 K/UL — SIGNIFICANT CHANGE UP (ref 3.8–10.5)
WBC # BLD: 10.55 K/UL — HIGH (ref 3.8–10.5)
WBC # FLD AUTO: 10.15 K/UL — SIGNIFICANT CHANGE UP (ref 3.8–10.5)
WBC # FLD AUTO: 10.55 K/UL — HIGH (ref 3.8–10.5)

## 2023-09-14 PROCEDURE — 73700 CT LOWER EXTREMITY W/O DYE: CPT | Mod: 26,LT,MA

## 2023-09-14 PROCEDURE — 99223 1ST HOSP IP/OBS HIGH 75: CPT

## 2023-09-14 PROCEDURE — 73502 X-RAY EXAM HIP UNI 2-3 VIEWS: CPT | Mod: 26,LT

## 2023-09-14 PROCEDURE — 73552 X-RAY EXAM OF FEMUR 2/>: CPT | Mod: 26,LT

## 2023-09-14 PROCEDURE — 99285 EMERGENCY DEPT VISIT HI MDM: CPT

## 2023-09-14 PROCEDURE — 73564 X-RAY EXAM KNEE 4 OR MORE: CPT | Mod: 26,LT

## 2023-09-14 PROCEDURE — 72125 CT NECK SPINE W/O DYE: CPT | Mod: 26,MA

## 2023-09-14 PROCEDURE — 99223 1ST HOSP IP/OBS HIGH 75: CPT | Mod: 57

## 2023-09-14 PROCEDURE — 27245 TREAT THIGH FRACTURE: CPT | Mod: LT

## 2023-09-14 PROCEDURE — 71045 X-RAY EXAM CHEST 1 VIEW: CPT | Mod: 26

## 2023-09-14 PROCEDURE — 73030 X-RAY EXAM OF SHOULDER: CPT | Mod: 26,LT

## 2023-09-14 PROCEDURE — 70450 CT HEAD/BRAIN W/O DYE: CPT | Mod: 26,MA

## 2023-09-14 DEVICE — SCREW LOKG 5X36MM: Type: IMPLANTABLE DEVICE | Site: LEFT | Status: FUNCTIONAL

## 2023-09-14 DEVICE — BLADE SYNTHES TI HELICAL 85MM: Type: IMPLANTABLE DEVICE | Site: LEFT | Status: FUNCTIONAL

## 2023-09-14 DEVICE — NAIL CANN TFNA 130 DEG 12X170MM STRL: Type: IMPLANTABLE DEVICE | Site: LEFT | Status: FUNCTIONAL

## 2023-09-14 RX ORDER — MORPHINE SULFATE 50 MG/1
2 CAPSULE, EXTENDED RELEASE ORAL ONCE
Refills: 0 | Status: DISCONTINUED | OUTPATIENT
Start: 2023-09-14 | End: 2023-09-14

## 2023-09-14 RX ORDER — ACETAMINOPHEN 500 MG
975 TABLET ORAL EVERY 8 HOURS
Refills: 0 | Status: DISCONTINUED | OUTPATIENT
Start: 2023-09-14 | End: 2023-09-18

## 2023-09-14 RX ORDER — SODIUM CHLORIDE 9 MG/ML
1000 INJECTION INTRAMUSCULAR; INTRAVENOUS; SUBCUTANEOUS
Refills: 0 | Status: COMPLETED | OUTPATIENT
Start: 2023-09-14 | End: 2023-09-15

## 2023-09-14 RX ORDER — DEXTROSE 50 % IN WATER 50 %
15 SYRINGE (ML) INTRAVENOUS ONCE
Refills: 0 | Status: DISCONTINUED | OUTPATIENT
Start: 2023-09-14 | End: 2023-09-18

## 2023-09-14 RX ORDER — AMLODIPINE BESYLATE 2.5 MG/1
2.5 TABLET ORAL DAILY
Refills: 0 | Status: DISCONTINUED | OUTPATIENT
Start: 2023-09-14 | End: 2023-09-18

## 2023-09-14 RX ORDER — SODIUM CHLORIDE 9 MG/ML
1000 INJECTION, SOLUTION INTRAVENOUS
Refills: 0 | Status: DISCONTINUED | OUTPATIENT
Start: 2023-09-14 | End: 2023-09-18

## 2023-09-14 RX ORDER — ONDANSETRON 8 MG/1
4 TABLET, FILM COATED ORAL ONCE
Refills: 0 | Status: DISCONTINUED | OUTPATIENT
Start: 2023-09-14 | End: 2023-09-15

## 2023-09-14 RX ORDER — GABAPENTIN 400 MG/1
300 CAPSULE ORAL
Refills: 0 | Status: DISCONTINUED | OUTPATIENT
Start: 2023-09-14 | End: 2023-09-18

## 2023-09-14 RX ORDER — POLYETHYLENE GLYCOL 3350 17 G/17G
17 POWDER, FOR SOLUTION ORAL DAILY
Refills: 0 | Status: DISCONTINUED | OUTPATIENT
Start: 2023-09-14 | End: 2023-09-18

## 2023-09-14 RX ORDER — OXYCODONE HYDROCHLORIDE 5 MG/1
2.5 TABLET ORAL EVERY 4 HOURS
Refills: 0 | Status: DISCONTINUED | OUTPATIENT
Start: 2023-09-14 | End: 2023-09-18

## 2023-09-14 RX ORDER — ATORVASTATIN CALCIUM 80 MG/1
40 TABLET, FILM COATED ORAL AT BEDTIME
Refills: 0 | Status: DISCONTINUED | OUTPATIENT
Start: 2023-09-14 | End: 2023-09-18

## 2023-09-14 RX ORDER — SENNA PLUS 8.6 MG/1
2 TABLET ORAL AT BEDTIME
Refills: 0 | Status: DISCONTINUED | OUTPATIENT
Start: 2023-09-14 | End: 2023-09-18

## 2023-09-14 RX ORDER — CEFAZOLIN SODIUM 1 G
2000 VIAL (EA) INJECTION EVERY 8 HOURS
Refills: 0 | Status: COMPLETED | OUTPATIENT
Start: 2023-09-14 | End: 2023-09-15

## 2023-09-14 RX ORDER — INSULIN LISPRO 100/ML
VIAL (ML) SUBCUTANEOUS AT BEDTIME
Refills: 0 | Status: DISCONTINUED | OUTPATIENT
Start: 2023-09-14 | End: 2023-09-18

## 2023-09-14 RX ORDER — DEXTROSE 50 % IN WATER 50 %
12.5 SYRINGE (ML) INTRAVENOUS ONCE
Refills: 0 | Status: DISCONTINUED | OUTPATIENT
Start: 2023-09-14 | End: 2023-09-18

## 2023-09-14 RX ORDER — DEXTROSE 50 % IN WATER 50 %
25 SYRINGE (ML) INTRAVENOUS ONCE
Refills: 0 | Status: DISCONTINUED | OUTPATIENT
Start: 2023-09-14 | End: 2023-09-18

## 2023-09-14 RX ORDER — METOPROLOL TARTRATE 50 MG
1 TABLET ORAL
Refills: 0 | DISCHARGE

## 2023-09-14 RX ORDER — CHLORHEXIDINE GLUCONATE 213 G/1000ML
1 SOLUTION TOPICAL ONCE
Refills: 0 | Status: COMPLETED | OUTPATIENT
Start: 2023-09-14 | End: 2023-09-14

## 2023-09-14 RX ORDER — AMLODIPINE BESYLATE 2.5 MG/1
1 TABLET ORAL
Refills: 0 | DISCHARGE

## 2023-09-14 RX ORDER — OXYCODONE HYDROCHLORIDE 5 MG/1
5 TABLET ORAL EVERY 4 HOURS
Refills: 0 | Status: DISCONTINUED | OUTPATIENT
Start: 2023-09-14 | End: 2023-09-18

## 2023-09-14 RX ORDER — POVIDONE-IODINE 5 %
1 AEROSOL (ML) TOPICAL ONCE
Refills: 0 | Status: COMPLETED | OUTPATIENT
Start: 2023-09-14 | End: 2023-09-14

## 2023-09-14 RX ORDER — MORPHINE SULFATE 50 MG/1
4 CAPSULE, EXTENDED RELEASE ORAL ONCE
Refills: 0 | Status: DISCONTINUED | OUTPATIENT
Start: 2023-09-14 | End: 2023-09-14

## 2023-09-14 RX ORDER — FENTANYL CITRATE 50 UG/ML
25 INJECTION INTRAVENOUS
Refills: 0 | Status: DISCONTINUED | OUTPATIENT
Start: 2023-09-14 | End: 2023-09-15

## 2023-09-14 RX ORDER — FAMOTIDINE 10 MG/ML
20 INJECTION INTRAVENOUS
Refills: 0 | Status: DISCONTINUED | OUTPATIENT
Start: 2023-09-14 | End: 2023-09-18

## 2023-09-14 RX ORDER — GLUCAGON INJECTION, SOLUTION 0.5 MG/.1ML
1 INJECTION, SOLUTION SUBCUTANEOUS ONCE
Refills: 0 | Status: DISCONTINUED | OUTPATIENT
Start: 2023-09-14 | End: 2023-09-18

## 2023-09-14 RX ORDER — FAMOTIDINE 10 MG/ML
1 INJECTION INTRAVENOUS
Refills: 0 | DISCHARGE

## 2023-09-14 RX ORDER — SIMVASTATIN 20 MG/1
1 TABLET, FILM COATED ORAL
Refills: 0 | DISCHARGE

## 2023-09-14 RX ORDER — OXYCODONE HYDROCHLORIDE 5 MG/1
5 TABLET ORAL ONCE
Refills: 0 | Status: DISCONTINUED | OUTPATIENT
Start: 2023-09-14 | End: 2023-09-15

## 2023-09-14 RX ORDER — ASPIRIN/CALCIUM CARB/MAGNESIUM 324 MG
81 TABLET ORAL
Refills: 0 | Status: DISCONTINUED | OUTPATIENT
Start: 2023-09-14 | End: 2023-09-18

## 2023-09-14 RX ORDER — TRAMADOL HYDROCHLORIDE 50 MG/1
50 TABLET ORAL EVERY 6 HOURS
Refills: 0 | Status: DISCONTINUED | OUTPATIENT
Start: 2023-09-14 | End: 2023-09-18

## 2023-09-14 RX ORDER — HYDROMORPHONE HYDROCHLORIDE 2 MG/ML
0.5 INJECTION INTRAMUSCULAR; INTRAVENOUS; SUBCUTANEOUS
Refills: 0 | Status: DISCONTINUED | OUTPATIENT
Start: 2023-09-14 | End: 2023-09-15

## 2023-09-14 RX ORDER — METOPROLOL TARTRATE 50 MG
100 TABLET ORAL DAILY
Refills: 0 | Status: DISCONTINUED | OUTPATIENT
Start: 2023-09-14 | End: 2023-09-18

## 2023-09-14 RX ORDER — INSULIN LISPRO 100/ML
VIAL (ML) SUBCUTANEOUS
Refills: 0 | Status: DISCONTINUED | OUTPATIENT
Start: 2023-09-14 | End: 2023-09-18

## 2023-09-14 RX ORDER — METFORMIN HYDROCHLORIDE 850 MG/1
1 TABLET ORAL
Refills: 0 | DISCHARGE

## 2023-09-14 RX ORDER — GABAPENTIN 400 MG/1
0 CAPSULE ORAL
Refills: 0 | DISCHARGE

## 2023-09-14 RX ADMIN — OXYCODONE HYDROCHLORIDE 5 MILLIGRAM(S): 5 TABLET ORAL at 12:44

## 2023-09-14 RX ADMIN — MORPHINE SULFATE 2 MILLIGRAM(S): 50 CAPSULE, EXTENDED RELEASE ORAL at 03:11

## 2023-09-14 RX ADMIN — SODIUM CHLORIDE 125 MILLILITER(S): 9 INJECTION INTRAMUSCULAR; INTRAVENOUS; SUBCUTANEOUS at 07:47

## 2023-09-14 RX ADMIN — CHLORHEXIDINE GLUCONATE 1 APPLICATION(S): 213 SOLUTION TOPICAL at 16:20

## 2023-09-14 RX ADMIN — MORPHINE SULFATE 2 MILLIGRAM(S): 50 CAPSULE, EXTENDED RELEASE ORAL at 04:37

## 2023-09-14 RX ADMIN — SODIUM CHLORIDE 125 MILLILITER(S): 9 INJECTION INTRAMUSCULAR; INTRAVENOUS; SUBCUTANEOUS at 15:31

## 2023-09-14 RX ADMIN — Medication 1 APPLICATION(S): at 17:26

## 2023-09-14 RX ADMIN — OXYCODONE HYDROCHLORIDE 5 MILLIGRAM(S): 5 TABLET ORAL at 13:44

## 2023-09-14 RX ADMIN — HYDROMORPHONE HYDROCHLORIDE 0.5 MILLIGRAM(S): 2 INJECTION INTRAMUSCULAR; INTRAVENOUS; SUBCUTANEOUS at 21:52

## 2023-09-14 NOTE — ED ADULT NURSE NOTE - NSFALLRISKINTERV_ED_ALL_ED
Assistance OOB with selected safe patient handling equipment if applicable/Assistance with ambulation/Communicate fall risk and risk factors to all staff, patient, and family/Monitor gait and stability/Provide visual cue: yellow wristband, yellow gown, etc/Reinforce activity limits and safety measures with patient and family/Call bell, personal items and telephone in reach/Instruct patient to call for assistance before getting out of bed/chair/stretcher/Non-slip footwear applied when patient is off stretcher/[place] to call system/Physically safe environment - no spills, clutter or unnecessary equipment/Purposeful Proactive Rounding/Room/bathroom lighting operational, light cord in reach

## 2023-09-14 NOTE — PRE-OP CHECKLIST - 3.
joints hip & knee bundle in chart patient is AOX3; patient is AOX3; granddaughter signs consents on her behalf

## 2023-09-14 NOTE — CONSULT NOTE ADULT - ASSESSMENT
86 y/o F with PMH of DM, HTN, HLD presents for left hip pain s/p fall yesterday evening. At around 7pm yesterday the patient was trying to sit down on an outdoor swing when she missed the swing and fell backwards to the ground. She hit the left side of the back of her head to the concrete floor as well as her left shoulder and hip. She usually walks with a cane or walker at her baseline but she was unable to stand or walk in any capacity after the fall. Her family assisted her into her bed hoping she would feel better as the evening progressed but instead they noticed that her left hip was looking more swollen and she was still complaining of significant pain prompting current visit. Denies any other complaints or concerns. Denies LOC, chest pain, SOB, cough, fevers, chills, abdominal pain, N/V/D/C, urinary complaints, HA, dizziness, numbness, tingling, weakness, sick contacts, recent travel.

## 2023-09-14 NOTE — CONSULT NOTE ADULT - PROBLEM SELECTOR RECOMMENDATION 4
at home on metformin and gabapentin   Hold Metformin   Check HB A1C   Moderate ISS for now, goal 140-180

## 2023-09-14 NOTE — CONSULT NOTE ADULT - PROBLEM SELECTOR RECOMMENDATION 9
Acute impacted left femoral intertrochanteric fracture with   subtrochanteric extension.  Plan for OR today  NWB LLE, bedrest  Management per ortho

## 2023-09-14 NOTE — ED ADULT NURSE NOTE - OBJECTIVE STATEMENT
break coverage RN: XOCHILT&Charito RR even unlabored completing full sentneces. pt presents s/p mechanical fall at home witnessed by family ~7pm. per family and pt states lost balance and hit left side of head on concrete, endorses left hip pain and foot pain. unable to ambulate post fall, needing assistance to get up from ground. endorses swelling to left knee and ankle. leg noted to be shorter compared to right. no active bleeding noted. pt winces in pain when touched on left side. Denies LOC or AC use. PMHx DM, HTN, HLD. MD at bedside for further eval. denies cp, dizziness/lightheadedness, abd pain, n/v/d, fevrs/chills, h/a. break coverage RN: XOCHILT&Ox4 RR even unlabored completing full sentences. primarily Icelandic speaking, family at bedside preferred translation. pt presents s/p mechanical fall at home witnessed by family ~7pm. per family and pt states lost balance and hit left side of head on concrete, endorses left hip pain and foot pain. unable to ambulate post fall, needing assistance to get up from ground. endorses swelling to left knee and ankle. leg noted to be shorter compared to right. no active bleeding noted. pt winces in pain when touched on left side. Denies LOC or AC use. PMHx DM, HTN, HLD. MD at bedside for further eval. denies cp, dizziness/lightheadedness, abd pain, n/v/d, fevrs/chills, h/a. report to be endorsed to primary RN.

## 2023-09-14 NOTE — ED PROVIDER NOTE - ATTENDING APP SHARED VISIT CONTRIBUTION OF CARE
HPI: 86 y/o F with PMH of DM, HTN, HLD presents for left hip pain s/p fall yesterday evening. At around 7pm yesterday the patient was trying to sit down on an outdoor swing when she missed the swing and fell backwards to the ground. She hit the left side of the back of her head to the concrete floor as well as her left shoulder and hip. She usually walks with a cane or walker at her baseline but she was unable to stand or walk in any capacity after the fall. Her family assisted her into her bed hoping she would feel better as the evening progressed but instead they noticed that her left hip was looking more swollen and she was still complaining of significant pain prompting current visit. Denies any other complaints or concerns. Denies LOC, chest pain, SOB, cough, fevers, chills, abdominal pain, N/V/D/C, urinary complaints, HA, dizziness, numbness, tingling, weakness, sick contacts, recent travel.    EXAM: Posterior head with small bruising but no hematoma.  Neck is supple C/T/L-spine no step-offs.  Left hip is externally rotated and shortened compared to right.  DP pulses are palpable able to wiggle toes sensation intact decreasing to motion of left hip.  Right leg is completely normal.  Pelvis is tender to the left hip.  Abdomen soft nontender rib cage is stable.  Heart is regular rate and rhythm lungs are clear to auscultation.    MDM:  87-year-old female with multiple medical problems that presents with a fall yesterday.  Now with externally rotated left hip and shortened leg.  Most likely femur fracture requiring surgery.  Will provide pain medication and obtain preop labs and consult orthopedic surgery for further recommendations most likely admission as patient cannot walk.

## 2023-09-14 NOTE — CONSULT NOTE ADULT - PROBLEM SELECTOR RECOMMENDATION 2
RCRI is 1 , class II perioperative rosk of 0.9%  [place] : score is 0.5%  Check EKG as diabetic and fall

## 2023-09-14 NOTE — ED ADULT NURSE REASSESSMENT NOTE - NS ED NURSE REASSESS COMMENT FT1
20gIV placed to L forearm, labs drawn and sent per order. pt tolerated well. stretcher lowest position siderails up safety maintained.
Received report form night CHERELLE matos. Received pt in room 26, A+OX4, Lithuanian speaking. pt is resting comfortably in stretcher. denies pain at this time. PERRLA and facial symmetry noted. pt denies numbness, tingling, and weakness in peripheral extremities. VSS, denies chest pain and SOB, RR even and unlabored. abdomen is flat, soft, nontender; denies nausea, vomiting, diarrhea, and constipation. pt has limited active and passive ROM in left lower extremity. skin is clean dry and intact. left forearm 20g noted, flushes well. pt is stable at this time.
pt in room 26. Made aware pt to have surgery later on the day. Pt granddaughter endorses pt has no metal in the body, dentures,hearing aids. Last meal was midnight 9/14. Safety maintained
Pt in room 26. A&Ox4. Post receiving IV morphine for pain pt endorses pain has partially improved now 3-4/10. Awaiting ortho consult and exam results. safety maintained. awaiting orders.

## 2023-09-14 NOTE — ED ADULT TRIAGE NOTE - CHIEF COMPLAINT QUOTE
Pt from home, s/p mechanical fall at 7PM, states lost balance and hit left side of head on concrete, endorses left hip pain and foot pain. Denies LOC or AC use. PMHx DM, HTN, HLD Pt from home, s/p witnessed mechanical fall at 7PM, states lost balance and hit left side of head on concrete, endorses left hip pain and foot pain. Denies LOC or AC use. PMHx DM, HTN, HLD

## 2023-09-14 NOTE — BRIEF OPERATIVE NOTE - NSICDXBRIEFPOSTOP_GEN_ALL_CORE_FT
POST-OP DIAGNOSIS:  Fracture of femur, intertrochanteric, left, closed 14-Sep-2023 21:43:19  Ander [place]

## 2023-09-14 NOTE — CONSULT NOTE ADULT - SUBJECTIVE AND OBJECTIVE BOX
Medicine Progress Note    Patient is a 87y old  Female who presents with a chief complaint of L hip intertrochanteric fracture (14 Sep 2023 07:08)      SUBJECTIVE / OVERNIGHT EVENTS:  patient zaks LIGIA ross use  #[id number] to obtain history   c/o left hip pain     MEDICATIONS  (STANDING):  acetaminophen     Tablet .. 975 milliGRAM(s) Oral every 8 hours  amLODIPine   Tablet 2.5 milliGRAM(s) Oral daily  atorvastatin 40 milliGRAM(s) Oral at bedtime  dextrose 5%. 1000 milliLiter(s) (50 mL/Hr) IV Continuous <Continuous>  dextrose 5%. 1000 milliLiter(s) (100 mL/Hr) IV Continuous <Continuous>  dextrose 50% Injectable 25 Gram(s) IV Push once  dextrose 50% Injectable 12.5 Gram(s) IV Push once  dextrose 50% Injectable 25 Gram(s) IV Push once  famotidine    Tablet 20 milliGRAM(s) Oral two times a day  gabapentin 300 milliGRAM(s) Oral two times a day  glucagon  Injectable 1 milliGRAM(s) IntraMuscular once  insulin lispro (ADMELOG) corrective regimen sliding scale   SubCutaneous at bedtime  insulin lispro (ADMELOG) corrective regimen sliding scale   SubCutaneous three times a day before meals  metoprolol succinate  milliGRAM(s) Oral daily  senna 2 Tablet(s) Oral at bedtime  sodium chloride 0.9%. 1000 milliLiter(s) (125 mL/Hr) IV Continuous <Continuous>    MEDICATIONS  (PRN):  dextrose Oral Gel 15 Gram(s) Oral once PRN Blood Glucose LESS THAN 70 milliGRAM(s)/deciliter  oxyCODONE    IR 2.5 milliGRAM(s) Oral every 4 hours PRN Moderate Pain (4 - 6)  oxyCODONE    IR 5 milliGRAM(s) Oral every 4 hours PRN Severe Pain (7 - 10)    CAPILLARY BLOOD GLUCOSE      POCT Blood Glucose.: 127 mg/dL (14 Sep 2023 07:35)  POCT Blood Glucose.: 177 mg/dL (14 Sep 2023 02:24)    I&O's Summary      PHYSICAL EXAM:  Vital Signs Last 24 Hrs  T(C): 36.8 (14 Sep 2023 06:01), Max: 36.8 (14 Sep 2023 06:01)  T(F): 98.2 (14 Sep 2023 06:01), Max: 98.2 (14 Sep 2023 06:01)  HR: 76 (14 Sep 2023 06:01) (76 - 87)  BP: 121/58 (14 Sep 2023 06:01) (121/58 - 134/56)  BP(mean): 73 (14 Sep 2023 06:01) (73 - 73)  RR: 20 (14 Sep 2023 06:01) (18 - 20)  SpO2: 99% (14 Sep 2023 06:01) (97% - 99%)    Parameters below as of 14 Sep 2023 06:01  Patient On (Oxygen Delivery Method): room air      CONSTITUTIONAL: NAD,   ENMT: Moist oral mucosa, no pharyngeal injection or exudates  RESPIRATORY: Normal respiratory effort; lungs are clear to auscultation bilaterally  CARDIOVASCULAR: Regular rate and rhythm, normal S1 and S2, no murmur; No lower extremity edema; Limited range of motion LLE  ABDOMEN: Nontender to palpation, normoactive bowel sounds, no rebound/guarding;   PSYCH: A+O to person, place, and time; affect appropriate  NEUROLOGY: CN 2-12 are intact and symmetric; no gross sensory deficits   SKIN: No rashes; no palpable lesions    LABS:                        11.7   10.55 )-----------( 213      ( 14 Sep 2023 03:00 )             33.7     09-14    134<L>  |  97<L>  |  17  ----------------------------<  178<H>  4.2   |  21<L>  |  0.86    Ca    10.1      14 Sep 2023 03:00    TPro  x   /  Alb  3.8  /  TBili  x   /  DBili  x   /  AST  x   /  ALT  x   /  AlkPhos  x   09-14    PT/INR - ( 14 Sep 2023 03:00 )   PT: 11.6 sec;   INR: 1.04 ratio         PTT - ( 14 Sep 2023 03:00 )  PTT:28.5 sec      Urinalysis Basic - ( 14 Sep 2023 03:00 )    Color: x / Appearance: x / SG: x / pH: x  Gluc: 178 mg/dL / Ketone: x  / Bili: x / Urobili: x   Blood: x / Protein: x / Nitrite: x   Leuk Esterase: x / RBC: x / WBC x   Sq Epi: x / Non Sq Epi: x / Bacteria: x            RADIOLOGY & ADDITIONAL TESTS:  Imaging from Last 24 Hours: Acute impacted left femoral intertrochanteric fracture with   subtrochanteric extension.      Electrocardiogram/QTc Interval: not able to locate     COORDINATION OF CARE:  Care Discussed with Consultants/Other Providers: Ortho , ER team

## 2023-09-14 NOTE — BRIEF OPERATIVE NOTE - PRIMARY SURGEON
Details (Free Text): Consult
Dr. Ryan Curtis
Detail Level: Detailed
Photo Preface (Leave Blank If You Do Not Want): Photographs were obtained today

## 2023-09-14 NOTE — ED PROVIDER NOTE - CLINICAL SUMMARY MEDICAL DECISION MAKING FREE TEXT BOX
88 y/o F with PMH of DM, HTN, HLD presents for left hip pain s/p fall yesterday evening. Patient likely with left sided hip fracture. Will obtain imaging of the hip, femur, and pelvis to evaluate for possible fracture. Will also get CT head, neck, and left shoulder xray to r/o any other injuries in the areas where she endorsed pain after falling. Blood work ordered in anticipation of possible admission for surgical intervention. Morphine ordered for pain. Will reassess. 86 y/o F with PMH of DM, HTN, HLD presents for left hip pain s/p fall yesterday evening. Patient likely with left sided hip fracture. Will obtain imaging of the hip, femur, and pelvis to evaluate for possible fracture. Will also get CT head, neck, and left shoulder xray to r/o any other injuries in the areas where she endorsed pain after falling. Blood work ordered in anticipation of possible admission for surgical intervention. Morphine ordered for pain. Will reassess.  CT head and neck are without signs of traumatic injury.  Xray hip shows a femoral neck fracture.  Rest of imaging and blood tests unremarkable for acute findings.  Ortho consulted. Ortho will admit the patient on to their service for hip fracture repair.

## 2023-09-14 NOTE — H&P ADULT - HISTORY OF PRESENT ILLNESS
HPI  87yFemale w/ _ c/o L hip pain s/p mechanical fall. Unable to bear weight in the LLE since the fall. Denies headstrike or LOC. Denies numbness/tingling in the LLE. Denies any other trauma/injuries at this time. At baseline, community ambulator w/o assistive devices.    ROS  Negative unless otherwise specified in HPI.    PAST MEDICAL & SURGICAL Hx  PAST MEDICAL & SURGICAL HISTORY:      MEDICATIONS  Home Medications:      ALLERGIES  No Known Allergies      FAMILY Hx  FAMILY HISTORY:      SOCIAL Hx  Social History:      VITALS  Vital Signs Last 24 Hrs  T(C): 36.8 (14 Sep 2023 06:01), Max: 36.8 (14 Sep 2023 06:01)  T(F): 98.2 (14 Sep 2023 06:01), Max: 98.2 (14 Sep 2023 06:01)  HR: 76 (14 Sep 2023 06:01) (76 - 87)  BP: 121/58 (14 Sep 2023 06:01) (121/58 - 134/56)  BP(mean): 73 (14 Sep 2023 06:01) (73 - 73)  RR: 20 (14 Sep 2023 06:01) (18 - 20)  SpO2: 99% (14 Sep 2023 06:01) (97% - 99%)    Parameters below as of 14 Sep 2023 06:01  Patient On (Oxygen Delivery Method): room air        PHYSICAL EXAM  Gen: Lying in bed, NAD  Resp: No increased WOB  LLE:  Skin intact, shortened and externally rotated, +edema and +ecchymosis over L hip  +TTP over L hip, no TTP along remainder of extremity; compartments soft  Limited ROM at hip 2/2 pain  +Log roll test  +Pain with axial loading  Motor: TA/EHL/GS/FHL intact  Sensory: DP/SP/Tib/Ben/Saph SILT  +DP pulse, WWP    Secondary survey:  No TTP along spine or other extremities, pelvis grossly stable, SILT and compartments soft throughout    LABS                        11.7   10.55 )-----------( 213      ( 14 Sep 2023 03:00 )             33.7     09-14    134<L>  |  97<L>  |  17  ----------------------------<  178<H>  4.2   |  21<L>  |  0.86    Ca    10.1      14 Sep 2023 03:00    TPro  7.0  /  Alb  3.9  /  TBili  0.6  /  DBili  x   /  AST  24  /  ALT  16  /  AlkPhos  71  09-14    PT/INR - ( 14 Sep 2023 03:00 )   PT: 11.6 sec;   INR: 1.04 ratio         PTT - ( 14 Sep 2023 03:00 )  PTT:28.5 sec    IMAGING  XRs: L IT fx (personal read)    ASSESSMENT & PLAN  87yFemale w/ L IT fx.  -NWB LLE, bedrest  -OR Today  -NP  -hold chemical DVT ppx for OR; SCDs OK  -FU  medical clearance ASAP for OR  -pain control  -ice/cold compress    For all questions related to patient care, please reach out to the on-call team via the pager.     Deborah Gaitan, PGY 2  Orthopaedic Surgery  [facility] [id number]  [facility] [id number]  [facility] [phone]   HPI  87yFemale w/hx of htn, hld, dm c/o L hip pain s/p mechanical fall. Unable to bear weight in the LLE since the fall. Denies headstrike or LOC. Denies numbness/tingling in the LLE. Denies any other trauma/injuries at this time. At baseline, community ambulator w/o assistive devices.    ROS  Negative unless otherwise specified in HPI.    PAST MEDICAL & SURGICAL Hx  PAST MEDICAL & SURGICAL HISTORY:      MEDICATIONS  Home Medications:      ALLERGIES  No Known Allergies      FAMILY Hx  FAMILY HISTORY:      SOCIAL Hx  Social History:      VITALS  Vital Signs Last 24 Hrs  T(C): 36.8 (14 Sep 2023 06:01), Max: 36.8 (14 Sep 2023 06:01)  T(F): 98.2 (14 Sep 2023 06:01), Max: 98.2 (14 Sep 2023 06:01)  HR: 76 (14 Sep 2023 06:01) (76 - 87)  BP: 121/58 (14 Sep 2023 06:01) (121/58 - 134/56)  BP(mean): 73 (14 Sep 2023 06:01) (73 - 73)  RR: 20 (14 Sep 2023 06:01) (18 - 20)  SpO2: 99% (14 Sep 2023 06:01) (97% - 99%)    Parameters below as of 14 Sep 2023 06:01  Patient On (Oxygen Delivery Method): room air        PHYSICAL EXAM  Gen: Lying in bed, NAD  Resp: No increased WOB  LLE:  Skin intact, shortened and externally rotated, +edema and +ecchymosis over L hip  +TTP over L hip, no TTP along remainder of extremity; compartments soft  Limited ROM at hip 2/2 pain  +Log roll test  +Pain with axial loading  Motor: TA/EHL/GS/FHL intact  Sensory: DP/SP/Tib/Ben/Saph SILT  +DP pulse, WWP    Secondary survey:  No TTP along spine or other extremities, pelvis grossly stable, SILT and compartments soft throughout    LABS                        11.7   10.55 )-----------( 213      ( 14 Sep 2023 03:00 )             33.7     09-14    134<L>  |  97<L>  |  17  ----------------------------<  178<H>  4.2   |  21<L>  |  0.86    Ca    10.1      14 Sep 2023 03:00    TPro  7.0  /  Alb  3.9  /  TBili  0.6  /  DBili  x   /  AST  24  /  ALT  16  /  AlkPhos  71  09-14    PT/INR - ( 14 Sep 2023 03:00 )   PT: 11.6 sec;   INR: 1.04 ratio         PTT - ( 14 Sep 2023 03:00 )  PTT:28.5 sec    IMAGING  XRs: L IT fx (personal read)    ASSESSMENT & PLAN  87yFemale w/ L IT fx.  -NWB LLE, bedrest  -OR Today  -NP  -hold chemical DVT ppx for OR; SCDs OK  -FU  medical clearance ASAP for OR  -pain control  -ice/cold compress    For all questions related to patient care, please reach out to the on-call team via the pager.     Deborah Gaitan, PGY 2  Orthopaedic Surgery  [facility] [id number]  [facility] [id number]  [facility] [phone]

## 2023-09-14 NOTE — ED PROVIDER NOTE - PROGRESS NOTE DETAILS
OMER: Patient found to have a left femur fracture.  Ortho aware and will admit patient to their service for surgical management.  Family is aware and amenable to plan.

## 2023-09-14 NOTE — BRIEF OPERATIVE NOTE - NSICDXBRIEFPREOP_GEN_ALL_CORE_FT
PRE-OP DIAGNOSIS:  Fracture of femur, intertrochanteric, left, closed 14-Sep-2023 21:43:14  Ander Montana

## 2023-09-14 NOTE — PRE-OP CHECKLIST - 2.
Romansh speaking - prefers family member to translate Arabic speaking - prefers family member to translate as per  Hang [id number]

## 2023-09-14 NOTE — ED ADULT NURSE NOTE - CHIEF COMPLAINT QUOTE
Pt from home, s/p witnessed mechanical fall at 7PM, states lost balance and hit left side of head on concrete, endorses left hip pain and foot pain. Denies LOC or AC use. PMHx DM, HTN, HLD

## 2023-09-14 NOTE — ED PROVIDER NOTE - PHYSICAL EXAMINATION
CONSTITUTIONAL: Comfortable; in no acute distress. Non-toxic appearing.   NEURO: Alert & oriented. Sensory and motor functions are grossly intact.  PSYCH: Mood appropriate. Thought processes intact.   HEAD: (+) small superficial hematoma to the left occipital scalp.  NECK: No mid or paraspinal cervical spinal tenderness to palpation. FROM intact.  CARD: Regular rate and rhythm, no murmurs  RESP: No accessory muscle use; breath sounds clear and equal bilaterally; no wheezes, rhonchi, or rales     ABD: Soft; non-distended; non-tender. No guarding or rebound.   MUSCULOSKELETAL/EXTREMITIES: (+) LLE slightly shortened and mildly externally rotated; Limited ROM in the LLE at the hip and knee 2/2 pain; mild swelling by the left upper thigh. Neurovascularly intact. Rest of extremities: WNL.  SKIN: Warm; dry; no apparent lesions or exudate

## 2023-09-14 NOTE — PRE-OP CHECKLIST - LAST DOSE WITHIN LAST 24HRS
Date of Service: 09/05/2023    Mom brought him in today to discuss his sleeping issues.  Mom said last night he had a hard time falling asleep, but he would normally sleep in until about 10:00 a.m. and then he would get up at 10:00 a.m. and then sometimes he will nap in the afternoon.  He would be up late until midnight or sometimes after playing video games. Last night, he got up again late morning, mom put him to bed at 9:00.  He had a hard time falling asleep. Nothing else has changed.  He likes to play video games at night.  Again, he likes to nap, likes to stay up late on weekends.  He does not really do caffeine, although he does take his Focalin in the morning, which he did this morning.  He would like to go home right now and nap as it is approaching 5:00 p.m., so mom is just concerned about that.      Otherwise, as far as his acne, he was using his Retin-A really well.  His skin was clearing up.  He stopped the Doxycycline because it upset his stomach, but mom would like to resume it because now that school started his acne has flared up a bit, but the Retin-A definitely did help a fair bit.    PHYSICAL EXAMINATION:   Other than again, he has got some hyperpigmented areas on his zygomatic area bilaterally, otherwise he has got a few new inflammatory papules noticed a couple on his chin, a couple on his right cheek.    ASSESSMENT:    Acne. Recommended resuming the Retin-A as previously prescribed.  Again, I wrote a sleep hygiene schedule for him, the rule of 2's.  He cannot sleep in 2 hours past his normal wake time, stay up 2 hours past his normal bedtime, no napping, no screens or eating before bed, just quiet activities all written out for the patient and will follow up as needed.        Dictated By: Akanksha Stephens MD  Signing Provider: MD ORA Clayton/dunia ([id number])   DD: 09/05/2023 6:15:53 PM TD: 09/06/2023 10:08:24 AM       Yes

## 2023-09-15 ENCOUNTER — TRANSCRIPTION ENCOUNTER (OUTPATIENT)
Age: 88
End: 2023-09-15

## 2023-09-15 LAB
ANION GAP SERPL CALC-SCNC: 14 MMOL/L — SIGNIFICANT CHANGE UP (ref 7–14)
BUN SERPL-MCNC: 13 MG/DL — SIGNIFICANT CHANGE UP (ref 7–23)
CALCIUM SERPL-MCNC: 8.3 MG/DL — LOW (ref 8.4–10.5)
CHLORIDE SERPL-SCNC: 100 MMOL/L — SIGNIFICANT CHANGE UP (ref 98–107)
CO2 SERPL-SCNC: 23 MMOL/L — SIGNIFICANT CHANGE UP (ref 22–31)
CREAT SERPL-MCNC: 0.73 MG/DL — SIGNIFICANT CHANGE UP (ref 0.5–1.3)
EGFR: 80 ML/MIN/1.73M2 — SIGNIFICANT CHANGE UP
GLUCOSE BLDC GLUCOMTR-MCNC: 147 MG/DL — HIGH (ref 70–99)
GLUCOSE BLDC GLUCOMTR-MCNC: 172 MG/DL — HIGH (ref 70–99)
GLUCOSE BLDC GLUCOMTR-MCNC: 173 MG/DL — HIGH (ref 70–99)
GLUCOSE BLDC GLUCOMTR-MCNC: 214 MG/DL — HIGH (ref 70–99)
GLUCOSE SERPL-MCNC: 203 MG/DL — HIGH (ref 70–99)
HCT VFR BLD CALC: 27.7 % — LOW (ref 34.5–45)
HGB BLD-MCNC: 9.1 G/DL — LOW (ref 11.5–15.5)
MAGNESIUM SERPL-MCNC: 1.8 MG/DL — SIGNIFICANT CHANGE UP (ref 1.6–2.6)
MCHC RBC-ENTMCNC: 30.8 PG — SIGNIFICANT CHANGE UP (ref 27–34)
MCHC RBC-ENTMCNC: 32.9 GM/DL — SIGNIFICANT CHANGE UP (ref 32–36)
MCV RBC AUTO: 93.9 FL — SIGNIFICANT CHANGE UP (ref 80–100)
NRBC # BLD: 0 /100 WBCS — SIGNIFICANT CHANGE UP (ref 0–0)
NRBC # FLD: 0 K/UL — SIGNIFICANT CHANGE UP (ref 0–0)
PHOSPHATE SERPL-MCNC: 3.7 MG/DL — SIGNIFICANT CHANGE UP (ref 2.5–4.5)
PLATELET # BLD AUTO: 171 K/UL — SIGNIFICANT CHANGE UP (ref 150–400)
POTASSIUM SERPL-MCNC: 4.3 MMOL/L — SIGNIFICANT CHANGE UP (ref 3.5–5.3)
POTASSIUM SERPL-SCNC: 4.3 MMOL/L — SIGNIFICANT CHANGE UP (ref 3.5–5.3)
RBC # BLD: 2.95 M/UL — LOW (ref 3.8–5.2)
RBC # FLD: 13 % — SIGNIFICANT CHANGE UP (ref 10.3–14.5)
SODIUM SERPL-SCNC: 137 MMOL/L — SIGNIFICANT CHANGE UP (ref 135–145)
WBC # BLD: 9.78 K/UL — SIGNIFICANT CHANGE UP (ref 3.8–10.5)
WBC # FLD AUTO: 9.78 K/UL — SIGNIFICANT CHANGE UP (ref 3.8–10.5)

## 2023-09-15 PROCEDURE — 99232 SBSQ HOSP IP/OBS MODERATE 35: CPT

## 2023-09-15 RX ORDER — INFLUENZA VIRUS VACCINE 15; 15; 15; 15 UG/.5ML; UG/.5ML; UG/.5ML; UG/.5ML
0.7 SUSPENSION INTRAMUSCULAR ONCE
Refills: 0 | Status: DISCONTINUED | OUTPATIENT
Start: 2023-09-15 | End: 2023-09-18

## 2023-09-15 RX ADMIN — GABAPENTIN 300 MILLIGRAM(S): 400 CAPSULE ORAL at 18:45

## 2023-09-15 RX ADMIN — Medication 1: at 11:54

## 2023-09-15 RX ADMIN — FAMOTIDINE 20 MILLIGRAM(S): 10 INJECTION INTRAVENOUS at 05:09

## 2023-09-15 RX ADMIN — Medication 975 MILLIGRAM(S): at 03:56

## 2023-09-15 RX ADMIN — OXYCODONE HYDROCHLORIDE 5 MILLIGRAM(S): 5 TABLET ORAL at 21:59

## 2023-09-15 RX ADMIN — SENNA PLUS 2 TABLET(S): 8.6 TABLET ORAL at 21:57

## 2023-09-15 RX ADMIN — OXYCODONE HYDROCHLORIDE 5 MILLIGRAM(S): 5 TABLET ORAL at 09:41

## 2023-09-15 RX ADMIN — Medication 975 MILLIGRAM(S): at 02:56

## 2023-09-15 RX ADMIN — Medication 975 MILLIGRAM(S): at 12:06

## 2023-09-15 RX ADMIN — Medication 975 MILLIGRAM(S): at 18:45

## 2023-09-15 RX ADMIN — Medication 975 MILLIGRAM(S): at 11:54

## 2023-09-15 RX ADMIN — Medication 100 MILLIGRAM(S): at 05:09

## 2023-09-15 RX ADMIN — Medication 1: at 17:05

## 2023-09-15 RX ADMIN — Medication 100 MILLIGRAM(S): at 10:36

## 2023-09-15 RX ADMIN — POLYETHYLENE GLYCOL 3350 17 GRAM(S): 17 POWDER, FOR SOLUTION ORAL at 11:54

## 2023-09-15 RX ADMIN — GABAPENTIN 300 MILLIGRAM(S): 400 CAPSULE ORAL at 05:09

## 2023-09-15 RX ADMIN — OXYCODONE HYDROCHLORIDE 5 MILLIGRAM(S): 5 TABLET ORAL at 13:50

## 2023-09-15 RX ADMIN — Medication 2: at 07:41

## 2023-09-15 RX ADMIN — OXYCODONE HYDROCHLORIDE 5 MILLIGRAM(S): 5 TABLET ORAL at 10:20

## 2023-09-15 RX ADMIN — Medication 81 MILLIGRAM(S): at 18:45

## 2023-09-15 RX ADMIN — AMLODIPINE BESYLATE 2.5 MILLIGRAM(S): 2.5 TABLET ORAL at 05:10

## 2023-09-15 RX ADMIN — OXYCODONE HYDROCHLORIDE 5 MILLIGRAM(S): 5 TABLET ORAL at 22:45

## 2023-09-15 RX ADMIN — Medication 81 MILLIGRAM(S): at 05:10

## 2023-09-15 RX ADMIN — Medication 100 MILLIGRAM(S): at 02:54

## 2023-09-15 RX ADMIN — FAMOTIDINE 20 MILLIGRAM(S): 10 INJECTION INTRAVENOUS at 18:45

## 2023-09-15 RX ADMIN — Medication 975 MILLIGRAM(S): at 18:58

## 2023-09-15 RX ADMIN — ATORVASTATIN CALCIUM 40 MILLIGRAM(S): 80 TABLET, FILM COATED ORAL at 21:58

## 2023-09-15 RX ADMIN — OXYCODONE HYDROCHLORIDE 5 MILLIGRAM(S): 5 TABLET ORAL at 14:20

## 2023-09-15 RX ADMIN — SODIUM CHLORIDE 125 MILLILITER(S): 9 INJECTION INTRAMUSCULAR; INTRAVENOUS; SUBCUTANEOUS at 09:41

## 2023-09-15 NOTE — PHYSICAL THERAPY INITIAL EVALUATION ADULT - NSPTDISCHREC_GEN_A_CORE
[facility] however family preference is for home discharge, if so pt would benefit from home PT

## 2023-09-15 NOTE — CHART NOTE - NSCHARTNOTEFT_GEN_A_CORE
ORTHOPEDIC POST-OPERATIVE NOTE    Patient is s/p L femoral IMN    Subjective:  Patient reports pain at the incisional site, controlled with medication  Denies chest pain, shortness of breath, nausea, vomiting    Vital Signs Last 24 Hrs  T(C): 36.2 (14 Sep 2023 23:58), Max: 37.1 (14 Sep 2023 09:43)  T(F): 97.1 (14 Sep 2023 23:58), Max: 98.8 (14 Sep 2023 09:43)  HR: 74 (14 Sep 2023 23:58) (70 - 87)  BP: 132/51 (14 Sep 2023 23:58) (100/49 - 159/61)  BP(mean): 64 (14 Sep 2023 23:00) (63 - 95)  RR: 20 (14 Sep 2023 23:58) (15 - 20)  SpO2: 94% (14 Sep 2023 23:58) (93% - 100%)    Parameters below as of 14 Sep 2023 23:58  Patient On (Oxygen Delivery Method): room air        I&O's Detail      Physical Exam:  General: NAD, resting comfortably in bed  Pulmonary: Nonlabored breathing, no respiratory distress  Abdominal: nondistended  Extremities:     Left Lower Extremity:  Dressing clean dry intact  5/5 EHL/FHL/TA/GS  SILT L3-S1  +DP/PT Pulses  Compartments soft  No calf TTP B/L      LABS:                        9.6    10.15 )-----------( 165      ( 14 Sep 2023 21:25 )             28.0     09-14    136  |  103  |  11  ----------------------------<  179<H>  3.9   |  25  |  0.82    Ca    8.1<L>      14 Sep 2023 21:25    TPro  x   /  Alb  3.8  /  TBili  x   /  DBili  x   /  AST  x   /  ALT  x   /  AlkPhos  x   09-14    PT/INR - ( 14 Sep 2023 03:00 )   PT: 11.6 sec;   INR: 1.04 ratio         PTT - ( 14 Sep 2023 03:00 )  PTT:28.5 sec      Assessment:  The patient is a 87y Female who is now several hours post-op from a L femoral IMN    Plan:  - Pain control as needed  - tolerating regular diet  - DVT ppx ASA 81BID  - OOB and ambulating as tolerated  - F/u AM labs    For all questions related to patient care, please reach out to the on-call team via the pager.     Deborah Gaitan, PGY 2  Orthopaedic Surgery  [facility] [id number]  [facility] [id number]  [facility] [phone]
ANESTHESIA POSTOP CHECK    87y Female POSTOP DAY 1     Vital Signs Last 24 Hrs  T(C): 36.7 (15 Sep 2023 09:47), Max: 36.9 (14 Sep 2023 21:10)  T(F): 98 (15 Sep 2023 09:47), Max: 98.4 (14 Sep 2023 21:10)  HR: 74 (15 Sep 2023 09:47) (70 - 82)  BP: 120/47 (15 Sep 2023 09:47) (100/49 - 159/61)  BP(mean): 64 (14 Sep 2023 23:00) (63 - 95)  RR: 18 (15 Sep 2023 09:47) (15 - 20)  SpO2: 97% (15 Sep 2023 09:47) (93% - 100%)    Parameters below as of 15 Sep 2023 09:47  Patient On (Oxygen Delivery Method): room air      I&O's Summary    14 Sep 2023 07:01  -  15 Sep 2023 07:00  --------------------------------------------------------  IN: 220 mL / OUT: 400 mL / NET: -180 mL        [X ] NO APPARENT ANESTHESIA COMPLICATIONS      Comments:

## 2023-09-15 NOTE — DISCHARGE NOTE PROVIDER - NSDCFUSCHEDAPPT_GEN_ALL_CORE_FT
Ryan Curtis  [facility] Physician [facility]  ORTHOSUR[address]  Scheduled Appointment: 09/29/2023

## 2023-09-15 NOTE — DISCHARGE NOTE PROVIDER - HOSPITAL COURSE
86 y/o Female presented [facility], found to have a left intertrochanteric fracture. Patient s/p left femur intramedullary nailing with Dr. Ryan Curtis on 9/14/23. Patient tolerated the procedure well without any intraoperative complications. Patient tolerated physical therapy well, and the pain was controlled. Pt is weight bearing as tolerated with cane/walker as needed. Seen by medical attending for continuity of care and management and cleared for safe discharge. Keep dressing/incision clean, dry and intact. Any sutures/staples to be removed on post-op day #14 your office visit. Pt is on Aspirin 81mg twice daily for DVT prophylaxis, please take for 6 weeks unless otherwise instructed by your surgeon. Please follow up with Dr. Curtis in 2 weeks, call the office to make an appointment, [phone]. Please follow up with your PMD for continuity of care and management as medications may have changed. 86 y/o Female presented to [facility] s/p [facility], found to have a left intertrochanteric fracture. Patient s/p left femur intramedullary nailing with Dr. Ryan Curtis on 9/14/23. Patient tolerated the procedure well without any intraoperative complications. Patient tolerated physical therapy well, and the pain was controlled. Pt is weight bearing as tolerated with cane/walker as needed. Seen by medical attending for continuity of care and management and cleared for safe discharge. Keep dressing/incision clean, dry and intact. Any sutures/staples to be removed on post-op day #14 your office visit. Pt is on Aspirin 81mg twice daily for DVT prophylaxis, please take for 6 weeks unless otherwise instructed by your surgeon. Please follow up with Dr. Curtis in 2 weeksat your scheduled postop appointment on  9/29. Please follow up with your PMD for continuity of care and management as medications may have changed. 86 y/o Female presented to [facility] s/p fall, found to have a left intertrochanteric fracture. Patient s/p left femur intramedullary nailing with Dr. Ryan Curtis on 9/14/23. Patient tolerated the procedure well without any intraoperative complications. Patient tolerated physical therapy well, and the pain was controlled. Patient was recommended for RAKAN on dc but family refused, home care agencies contacted and family aware of potential risk. Pt is weight bearing as tolerated with cane/walker as needed. Seen by medical attending for continuity of care and management and cleared for safe discharge. Keep dressing/incision clean, dry and intact. Any sutures/staples to be removed on post-op day #14 your office visit. Pt is on Aspirin 81mg twice daily for DVT prophylaxis, please take for 6 weeks unless otherwise instructed by your surgeon. Please follow up with Dr. Curtis in 2 weeks at your scheduled postop appointment on 9/29. Please follow up with your PMD for continuity of care and management as medications may have changed.

## 2023-09-15 NOTE — DISCHARGE NOTE PROVIDER - NSDCCPTREATMENT_GEN_ALL_CORE_FT
PRINCIPAL PROCEDURE  Procedure: ORIF, hip, with intramedullary nail  Findings and Treatment: 88 y/o Female presented to [facility] s/p fall, found to have a left intertrochanteric fracture. Patient s/p left femur intramedullary nailing with Dr. Ryan Curtis on 9/14/23. Patient tolerated the procedure well without any intraoperative complications. Patient tolerated physical therapy well, and the pain was controlled. Pt is weight bearing as tolerated with cane/walker as needed. Seen by medical attending for continuity of care and management and cleared for safe discharge. Keep dressing/incision clean, dry and intact. Any sutures/staples to be removed on post-op day #14 your office visit. Pt is on Aspirin 81mg twice daily for DVT prophylaxis, please take for 6 weeks unless otherwise instructed by your surgeon. Please follow up with Dr. Curtis in 2 weeks, call the office to make an appointment, [phone]. Please follow up with your PMD for continuity of care and management as medications may have changed.     PRINCIPAL PROCEDURE  Procedure: ORIF, hip, with intramedullary nail  Findings and Treatment:

## 2023-09-15 NOTE — DISCHARGE NOTE PROVIDER - CARE PROVIDER_API CALL
Ryan Curtis  Orthopaedic Surgery  [address], [address]  [place] [address]  Phone: [phone]  Fax: ()-  Follow Up Time: 2 weeks

## 2023-09-15 NOTE — PROGRESS NOTE ADULT - SUBJECTIVE AND OBJECTIVE BOX
Ortho Progress Note    S: Patient seen and examined. No acute events overnight. Pain well controlled with current regimen. Denies lightheadedness/dizziness, CP/SOB. Tolerating diet.       O:  Physical Exam:  Gen: Laying in bed, NAD, alert and oriented.   Resp: Unlabored breathing  LLE: Dressings CDI           EHL/FHL/TA/Sol intact          + SILT DP/SP/ANTONIO/Sa/Tib          +DP, extremity WWP    Vital Signs Last 24 Hrs  T(C): 36.6 (15 Sep 2023 05:01), Max: 37.1 (14 Sep 2023 09:43)  T(F): 97.8 (15 Sep 2023 05:01), Max: 98.8 (14 Sep 2023 09:43)  HR: 74 (15 Sep 2023 05:01) (70 - 82)  BP: 127/49 (15 Sep 2023 05:01) (100/49 - 159/61)  BP(mean): 64 (14 Sep 2023 23:00) (63 - 95)  RR: 18 (15 Sep 2023 05:01) (15 - 20)  SpO2: 99% (15 Sep 2023 05:01) (93% - 100%)    Parameters below as of 15 Sep 2023 05:01  Patient On (Oxygen Delivery Method): room air                              9.6    10.15 )-----------( 165      ( 14 Sep 2023 21:25 )             28.0                         11.7   10.55 )-----------( 213      ( 14 Sep 2023 03:00 )             33.7       09-14    136  |  103  |  11  ----------------------------<  179<H>  3.9   |  25  |  0.82        PT/INR - ( 14 Sep 2023 03:00 )   PT: 11.6 sec;   INR: 1.04 ratio         PTT - ( 14 Sep 2023 03:00 )  PTT:28.5 sec          A/P: 87F POD1 s/p L IMN, doing well  - Pain control  - WBAT  - PT/OT  - DVT ppx ASA 81 BID  - FU AM labs  - Dispo planning

## 2023-09-15 NOTE — OCCUPATIONAL THERAPY INITIAL EVALUATION ADULT - PERTINENT HX OF CURRENT PROBLEM, REHAB EVAL
86 y/o F with PMH of DM, HTN, HLD presents for left hip pain s/p fall. Found to have left intertrochanteric fracture now s/p left hip IMN on 9/14/23.

## 2023-09-15 NOTE — PHYSICAL THERAPY INITIAL EVALUATION ADULT - RANGE OF MOTION EXAMINATION, REHAB EVAL
left hip flexion active assistive ROM at least 0-50/bilateral upper extremity ROM was WFL (within functional limits)/Right LE ROM was WFL (within functional limits)

## 2023-09-15 NOTE — PROGRESS NOTE ADULT - SUBJECTIVE AND OBJECTIVE BOX
Medicine Progress Note    Patient is a 87y old  Female who presents with a chief complaint of L hip intertrochanteric fracture (15 Sep 2023 09:30)      SUBJECTIVE / OVERNIGHT EVENTS: no events  over night       MEDICATIONS  (STANDING):  acetaminophen     Tablet .. 975 milliGRAM(s) Oral every 8 hours  amLODIPine   Tablet 2.5 milliGRAM(s) Oral daily  aspirin enteric coated 81 milliGRAM(s) Oral two times a day  atorvastatin 40 milliGRAM(s) Oral at bedtime  dextrose 5%. 1000 milliLiter(s) (100 mL/Hr) IV Continuous <Continuous>  dextrose 5%. 1000 milliLiter(s) (50 mL/Hr) IV Continuous <Continuous>  dextrose 50% Injectable 25 Gram(s) IV Push once  dextrose 50% Injectable 12.5 Gram(s) IV Push once  dextrose 50% Injectable 25 Gram(s) IV Push once  famotidine    Tablet 20 milliGRAM(s) Oral two times a day  gabapentin 300 milliGRAM(s) Oral two times a day  glucagon  Injectable 1 milliGRAM(s) IntraMuscular once  influenza  Vaccine (HIGH DOSE) 0.7 milliLiter(s) IntraMuscular once  insulin lispro (ADMELOG) corrective regimen sliding scale   SubCutaneous at bedtime  insulin lispro (ADMELOG) corrective regimen sliding scale   SubCutaneous three times a day before meals  metoprolol succinate  milliGRAM(s) Oral daily  polyethylene glycol 3350 17 Gram(s) Oral daily  senna 2 Tablet(s) Oral at bedtime    MEDICATIONS  (PRN):  dextrose Oral Gel 15 Gram(s) Oral once PRN Blood Glucose LESS THAN 70 milliGRAM(s)/deciliter  oxyCODONE    IR 5 milliGRAM(s) Oral every 4 hours PRN Severe Pain (7 - 10)  oxyCODONE    IR 2.5 milliGRAM(s) Oral every 4 hours PRN Moderate Pain (4 - 6)  traMADol 50 milliGRAM(s) Oral every 6 hours PRN Mild Pain (1 - 3)    CAPILLARY BLOOD GLUCOSE      POCT Blood Glucose.: 172 mg/dL (15 Sep 2023 11:26)  POCT Blood Glucose.: 214 mg/dL (15 Sep 2023 07:19)  POCT Blood Glucose.: 167 mg/dL (14 Sep 2023 21:11)  POCT Blood Glucose.: 136 mg/dL (14 Sep 2023 16:19)    I&O's Summary    14 Sep 2023 07:01  -  15 Sep 2023 07:00  --------------------------------------------------------  IN: 220 mL / OUT: 400 mL / NET: -180 mL    15 Sep 2023 07:01  -  15 Sep 2023 15:22  --------------------------------------------------------  IN: 240 mL / OUT: 600 mL / NET: -360 mL        PHYSICAL EXAM:  Vital Signs Last 24 Hrs  T(C): 36.9 (15 Sep 2023 13:47), Max: 36.9 (14 Sep 2023 21:10)  T(F): 98.4 (15 Sep 2023 13:47), Max: 98.4 (14 Sep 2023 21:10)  HR: 89 (15 Sep 2023 13:47) (70 - 89)  BP: 112/41 (15 Sep 2023 13:47) (100/49 - 159/61)  BP(mean): 64 (14 Sep 2023 23:00) (63 - 95)  RR: 17 (15 Sep 2023 13:47) (15 - 20)  SpO2: 95% (15 Sep 2023 13:47) (93% - 100%)    Parameters below as of 15 Sep 2023 13:47  Patient On (Oxygen Delivery Method): room air      CONSTITUTIONAL: NAD,   ENMT: Moist oral mucosa, no pharyngeal injection or exudates;   RESPIRATORY: Normal respiratory effort; lungs are clear to auscultation bilaterally  CARDIOVASCULAR: Regular rate and rhythm, normal S1 and S2, ; No lower extremity edema; Left hip dressing   ABDOMEN: Nontender to palpation, normoactive bowel sounds, no rebound/guarding;   PSYCH: A+O to person, place, and time; affect appropriate  NEUROLOGY: CN 2-12 are intact and symmetric; no gross sensory deficits   SKIN: No rashes; no palpable lesions    LABS:                        9.1    9.78  )-----------( 171      ( 15 Sep 2023 05:29 )             27.7     09-15    137  |  100  |  13  ----------------------------<  203<H>  4.3   |  23  |  0.73    Ca    8.3<L>      15 Sep 2023 05:29  Phos  3.7     09-15  Mg     1.80     09-15    TPro  x   /  Alb  3.8  /  TBili  x   /  DBili  x   /  AST  x   /  ALT  x   /  AlkPhos  x   09-14    PT/INR - ( 14 Sep 2023 03:00 )   PT: 11.6 sec;   INR: 1.04 ratio         PTT - ( 14 Sep 2023 03:00 )  PTT:28.5 sec      Urinalysis Basic - ( 15 Sep 2023 05:29 )    Color: x / Appearance: x / SG: x / pH: x  Gluc: 203 mg/dL / Ketone: x  / Bili: x / Urobili: x   Blood: x / Protein: x / Nitrite: x   Leuk Esterase: x / RBC: x / WBC x   Sq Epi: x / Non Sq Epi: x / Bacteria: x            RADIOLOGY & ADDITIONAL TESTS:  Imaging from Last 24 Hours:    Electrocardiogram/QTc Interval:    COORDINATION OF CARE:  Care Discussed with Consultants/Other Providers: /ortho

## 2023-09-15 NOTE — OCCUPATIONAL THERAPY INITIAL EVALUATION ADULT - DIAGNOSIS, OT EVAL
s/p fall, s/p left intertrochanteric fracture, s/p left hip IMN; decreased functional mobility, decreased ADL performance

## 2023-09-15 NOTE — PHYSICAL THERAPY INITIAL EVALUATION ADULT - GENERAL OBSERVATIONS, REHAB EVAL
Pt seen lying in bed, htai lo; granddtr at bedside ; Pt prefers for Granddtr to translate as  phone is different dialect Pt seen lying in bed, thai lo; granddtr at bedside ; Pt prefers for Granddtr to translate as  phone is different dialect ; HR 74

## 2023-09-15 NOTE — PHYSICAL THERAPY INITIAL EVALUATION ADULT - PERTINENT HX OF CURRENT PROBLEM, REHAB EVAL
88 y/o female s/p fall with left hip fx now s/p IM nail Left hip fx 86 y/o female s/p fall with left hip IT fx now s/p IM nail Left hip fx

## 2023-09-15 NOTE — PATIENT PROFILE ADULT - FALL HARM RISK - HARM RISK INTERVENTIONS
Assistance with ambulation/Assistance OOB with selected safe patient handling equipment/Communicate Risk of Fall with Harm to all staff/Discuss with provider need for PT consult/Monitor gait and stability/Provide patient with walking aids - walker, cane, crutches/Reinforce activity limits and safety measures with patient and family/Sit up slowly, dangle for a short time, stand at bedside before walking/Tailored Fall Risk Interventions/Use of alarms - bed, chair and/or voice tab/Visual Cue: Yellow wristband and red socks/Bed in lowest position, wheels locked, appropriate side rails in place/Call bell, personal items and telephone in reach/Instruct patient to call for assistance before getting out of bed or chair/Non-slip footwear when patient is out of bed/[place] to call system/Physically safe environment - no spills, clutter or unnecessary equipment/Purposeful Proactive Rounding/Room/bathroom lighting operational, light cord in reach

## 2023-09-15 NOTE — OCCUPATIONAL THERAPY INITIAL EVALUATION ADULT - LIVES WITH, PROFILE
Patient lives in a private home with her granddaughter and family, wheelchair accessible, patient stays on the first floor.

## 2023-09-15 NOTE — DISCHARGE NOTE PROVIDER - NSDCMRMEDTOKEN_GEN_ALL_CORE_FT
albuterol 1.25 mg/3 mL (0.042%) inhalation solution: 3 milliliter(s) inhaled every 8 hours, As Needed  alendronate 70 mg oral tablet: 1 tab(s) orally once a week, before breakfast (don&#x27;t lie down for 30 minutes)  amLODIPine 2.5 mg oral tablet: 1 orally once a day  Artificial Tears ophthalmic solution: 1 drop(s) to each affected eye 4 times a day  aspirin 81 mg oral tablet: 1 orally once a day  aspirin 81 mg oral tablet: orally once a day  azithromycin 500 mg oral tablet: 1 tab(s) orally once a day   benzonatate 200 mg oral capsule: 1 cap(s) orally 3 times a day  cefuroxime 500 mg oral tablet: 1 tab(s) orally 2 times a day   famotidine 20 mg oral tablet: 1 tablet orally 2 times a day  famotidine 40 mg/5 mL oral liquid: 5 milliliter(s) orally once a day (at bedtime)  Fish Oil 1000 mg oral capsule: 1  orally once a day  fluticasone propionate: 50 microgram(s) inhaled 2 times a day  gabapentin 300 mg oral capsule: orally 2 times a day  gabapentin 300 mg oral tablet: orally 2 times a day  lidocaine 2.5% topical ointment: Apply topically to affected area 2 times a day  loratadine 10 mg oral tablet: 1 tab(s) orally once a day  Mapap 500 mg oral tablet: 1 tab(s) orally every 6 hours  metFORMIN 500 mg oral tablet: 1 orally once a day  metFORMIN 500 mg oral tablet, extended release: 1 tab(s) orally once a day, in the evening with food  metoprolol succinate 50 mg oral tablet, extended release: 1 tab(s) orally once a day  Metoprolol Succinate  mg oral tablet, extended release: 1 orally once a day  Oyster Shell Calcium 500 (1250 mg calcium carbonate) oral tablet: 1  orally once a day  Procto-Med HC 2.5% rectal cream with applicator: 1 application rectal 2 times a day  promethazine 6.25 mg/5 mL oral syrup: 5 milliliter(s) orally every 6 hours, As Needed  Reguloid Sugar Free oral powder for reconstitution: mix 1 teaspoon with 8 oz of liquid and drink by mouth once a day as directed  Senna Plus 50 mg-8.6 mg oral tablet: 1  orally once a day (at bedtime), As Needed  simvastatin 20 mg oral tablet: 1 tab(s) orally once a day (at bedtime)  simvastatin 20 mg oral tablet: 1 orally once a day (at bedtime)  Vitamin B Complex 100: 1  orally once a day   acetaminophen 325 mg oral tablet: 3 tab(s) orally every 8 hours  amLODIPine 2.5 mg oral tablet: 1 orally once a day  aspirin 81 mg oral delayed release tablet: 1 tab(s) orally 2 times a day continue taking twice daily until 10/28, then resume home dose  famotidine 20 mg oral tablet: 1 tablet orally 2 times a day  gabapentin 300 mg oral tablet: orally 2 times a day  metFORMIN 500 mg oral tablet: 1 orally once a day  Metoprolol Succinate  mg oral tablet, extended release: 1 orally once a day  oxyCODONE 5 mg oral tablet: 1 tab(s) orally every 6 hours as needed for Severe Pain (7 - 10) MDD: 4  Physical Therapy: Please evaluate and treat 2-3 times per week x 6 weeks  Dx: s/p left femur intramedullary nail  WBAT to LLE  Please fax all information to Dr. Curtis at [phone]  polyethylene glycol 3350 oral powder for reconstitution: 17 gram(s) orally once a day  senna leaf extract oral tablet: 2 tab(s) orally once a day (at bedtime)  simvastatin 20 mg oral tablet: 1 orally once a day (at bedtime)

## 2023-09-16 ENCOUNTER — TRANSCRIPTION ENCOUNTER (OUTPATIENT)
Age: 88
End: 2023-09-16

## 2023-09-16 LAB
GLUCOSE BLDC GLUCOMTR-MCNC: 131 MG/DL — HIGH (ref 70–99)
GLUCOSE BLDC GLUCOMTR-MCNC: 135 MG/DL — HIGH (ref 70–99)
GLUCOSE BLDC GLUCOMTR-MCNC: 138 MG/DL — HIGH (ref 70–99)
GLUCOSE BLDC GLUCOMTR-MCNC: 151 MG/DL — HIGH (ref 70–99)

## 2023-09-16 RX ADMIN — Medication 975 MILLIGRAM(S): at 17:15

## 2023-09-16 RX ADMIN — SENNA PLUS 2 TABLET(S): 8.6 TABLET ORAL at 22:18

## 2023-09-16 RX ADMIN — OXYCODONE HYDROCHLORIDE 5 MILLIGRAM(S): 5 TABLET ORAL at 10:01

## 2023-09-16 RX ADMIN — Medication 100 MILLIGRAM(S): at 08:02

## 2023-09-16 RX ADMIN — ATORVASTATIN CALCIUM 40 MILLIGRAM(S): 80 TABLET, FILM COATED ORAL at 22:18

## 2023-09-16 RX ADMIN — GABAPENTIN 300 MILLIGRAM(S): 400 CAPSULE ORAL at 17:15

## 2023-09-16 RX ADMIN — Medication 81 MILLIGRAM(S): at 08:01

## 2023-09-16 RX ADMIN — AMLODIPINE BESYLATE 2.5 MILLIGRAM(S): 2.5 TABLET ORAL at 08:03

## 2023-09-16 RX ADMIN — GABAPENTIN 300 MILLIGRAM(S): 400 CAPSULE ORAL at 08:02

## 2023-09-16 RX ADMIN — POLYETHYLENE GLYCOL 3350 17 GRAM(S): 17 POWDER, FOR SOLUTION ORAL at 11:48

## 2023-09-16 RX ADMIN — Medication 975 MILLIGRAM(S): at 03:25

## 2023-09-16 RX ADMIN — FAMOTIDINE 20 MILLIGRAM(S): 10 INJECTION INTRAVENOUS at 08:02

## 2023-09-16 RX ADMIN — Medication 1: at 11:49

## 2023-09-16 RX ADMIN — Medication 975 MILLIGRAM(S): at 02:27

## 2023-09-16 RX ADMIN — FAMOTIDINE 20 MILLIGRAM(S): 10 INJECTION INTRAVENOUS at 17:15

## 2023-09-16 RX ADMIN — OXYCODONE HYDROCHLORIDE 5 MILLIGRAM(S): 5 TABLET ORAL at 09:09

## 2023-09-16 RX ADMIN — Medication 81 MILLIGRAM(S): at 17:15

## 2023-09-16 NOTE — DISCHARGE NOTE NURSING/CASE MANAGEMENT/SOCIAL WORK - NSDCPNINST_GEN_ALL_CORE
Maintain  incisions and dressings clean dry and intact. Call MD with any signs of infection ie fever, redness, drainage, or with signs of bleeding, unrelieved increased pain, or persistent nausea. Continue to drink plenty of fluids. Avoid strenuous activity and constipation which may be a side effect from taking certain medications and narcotics.  Continue safety and fall prevention measures as instructed to avoid injury.  Continue Diabetes management, diet and glucose monitoring, know your A1C blood level and follow up with PMD for continuity of care. Remember hand hygiene, skin inspection for prevention of infection.

## 2023-09-16 NOTE — DISCHARGE NOTE NURSING/CASE MANAGEMENT/SOCIAL WORK - NSDCPEFALRISK_GEN_ALL_CORE
For information on Fall & Injury Prevention, visit: [email or web address] OR  [email or web address] OR  https://www.cdc.gov/steadi/patient.html

## 2023-09-16 NOTE — DISCHARGE NOTE NURSING/CASE MANAGEMENT/SOCIAL WORK - NSDPDISTO_GEN_ALL_CORE
Pt incision with dressing intact, positive NV status. VS stable Afebrile. pt feliciano po diet, voiding/Skilled Nursing Facility Pt incision with dressing intact, positive NV status. VS stable Afebrile. pt feliciano po diet, voiding/Home with home care

## 2023-09-16 NOTE — PROGRESS NOTE ADULT - SUBJECTIVE AND OBJECTIVE BOX
Orthopedic Surgery Progress Note     S: Patient seen and examined today. No acute events overnight. Pain is well controlled. Denies f/c, chest pain, shortness of breath, dizziness.    MEDICATIONS  (STANDING):  acetaminophen     Tablet .. 975 milliGRAM(s) Oral every 8 hours  amLODIPine   Tablet 2.5 milliGRAM(s) Oral daily  aspirin enteric coated 81 milliGRAM(s) Oral two times a day  atorvastatin 40 milliGRAM(s) Oral at bedtime  dextrose 5%. 1000 milliLiter(s) (100 mL/Hr) IV Continuous <Continuous>  dextrose 5%. 1000 milliLiter(s) (50 mL/Hr) IV Continuous <Continuous>  dextrose 50% Injectable 25 Gram(s) IV Push once  dextrose 50% Injectable 12.5 Gram(s) IV Push once  dextrose 50% Injectable 25 Gram(s) IV Push once  famotidine    Tablet 20 milliGRAM(s) Oral two times a day  gabapentin 300 milliGRAM(s) Oral two times a day  glucagon  Injectable 1 milliGRAM(s) IntraMuscular once  influenza  Vaccine (HIGH DOSE) 0.7 milliLiter(s) IntraMuscular once  insulin lispro (ADMELOG) corrective regimen sliding scale   SubCutaneous at bedtime  insulin lispro (ADMELOG) corrective regimen sliding scale   SubCutaneous three times a day before meals  metoprolol succinate  milliGRAM(s) Oral daily  polyethylene glycol 3350 17 Gram(s) Oral daily  senna 2 Tablet(s) Oral at bedtime    MEDICATIONS  (PRN):  dextrose Oral Gel 15 Gram(s) Oral once PRN Blood Glucose LESS THAN 70 milliGRAM(s)/deciliter  oxyCODONE    IR 5 milliGRAM(s) Oral every 4 hours PRN Severe Pain (7 - 10)  oxyCODONE    IR 2.5 milliGRAM(s) Oral every 4 hours PRN Moderate Pain (4 - 6)  traMADol 50 milliGRAM(s) Oral every 6 hours PRN Mild Pain (1 - 3)      Vital Signs Last 24 Hrs  T(C): 37 (16 Sep 2023 21:37), Max: 37.8 (16 Sep 2023 17:41)  T(F): 98.6 (16 Sep 2023 21:37), Max: 100 (16 Sep 2023 17:41)  HR: 88 (16 Sep 2023 21:37) (69 - 101)  BP: 154/61 (16 Sep 2023 21:37) (112/41 - 156/61)  BP(mean): --  RR: 18 (16 Sep 2023 21:37) (16 - 18)  SpO2: 96% (16 Sep 2023 21:37) (94% - 96%)    Parameters below as of 16 Sep 2023 21:37  Patient On (Oxygen Delivery Method): room air        09-15-23 @ 07:01  -  09-16-23 @ 07:00  --------------------------------------------------------  IN: 720 mL / OUT: 1700 mL / NET: -980 mL        Physical Exam:  Gen: NAD  L Lower Extremity:  Incision c/d/i  SILT S/S/DP/SP/T  +EHL/FHL/TA/GSC  Compartments soft/non-tender  Toes warm, Cap refill brisk/warm and perfused, +DP/PT pulse         LABS:                        9.1    9.78  )-----------( 171      ( 15 Sep 2023 05:29 )             27.7     09-15    137  |  100  |  13  ----------------------------<  203<H>  4.3   |  23  |  0.73    Ca    8.3<L>      15 Sep 2023 05:29  Phos  3.7     09-15  Mg     1.80     09-15    87F POD2 s/p L IMN, doing well.    - Pain control  - WBAT  - PT/OT  - DVT ppx ASA 81 BID  - FU AM labs  - Dispo planning to home per family request, needs PT clearance

## 2023-09-16 NOTE — DISCHARGE NOTE NURSING/CASE MANAGEMENT/SOCIAL WORK - NSTRANSFERBELONGINGSDISPO_GEN_A_NUR
Topical Steroids Counseling: I discussed with the patient that prolonged use of topical steroids can result in the increased appearance of superficial blood vessels (telangiectasias), lightening (hypopigmentation) and thinning of the skin (atrophy).  Patient understands to avoid using high potency steroids in skin folds, the groin or the face.  The patient verbalized understanding of the proper use and possible adverse effects of topical steroids.  All of the patient's questions and concerns were addressed.
Detail Level: Zone
not applicable

## 2023-09-16 NOTE — PROGRESS NOTE ADULT - SUBJECTIVE AND OBJECTIVE BOX
Anesthesia Post-op Note    POD#2 S/P Left femur fracture IM Nail    Patient is doing well.  OOBAA. Tolerating clears.  Pain is tolerable.  No residual anesthetic issues or complications noted.

## 2023-09-16 NOTE — DISCHARGE NOTE NURSING/CASE MANAGEMENT/SOCIAL WORK - PATIENT PORTAL LINK FT
You can access the FollowMyHealth Patient Portal offered by [facility] by registering at the following website: [email or web address]. By joining [employer]’s FollowMyHealth portal, you will also be able to view your health information using other applications (apps) compatible with our system.

## 2023-09-16 NOTE — DISCHARGE NOTE NURSING/CASE MANAGEMENT/SOCIAL WORK - NSDCPECAREGIVERED_GEN_ALL_CORE
Medline and carenotes for surgical procedure ORIF Hip Fracture, IM Nail, Incision care, Pain management, Diabetes, as well as DC Medications and side effects literature for patient reference.

## 2023-09-16 NOTE — DISCHARGE NOTE NURSING/CASE MANAGEMENT/SOCIAL WORK - NSSCCARECORD_GEN_ALL_CORE
Home Care Agency/Durable Medical Equipment Agency/Community [facility] Durable Medical Equipment Agency/Community Resource

## 2023-09-17 LAB
ANION GAP SERPL CALC-SCNC: 10 MMOL/L — SIGNIFICANT CHANGE UP (ref 7–14)
BUN SERPL-MCNC: 10 MG/DL — SIGNIFICANT CHANGE UP (ref 7–23)
CALCIUM SERPL-MCNC: 8.7 MG/DL — SIGNIFICANT CHANGE UP (ref 8.4–10.5)
CHLORIDE SERPL-SCNC: 105 MMOL/L — SIGNIFICANT CHANGE UP (ref 98–107)
CO2 SERPL-SCNC: 27 MMOL/L — SIGNIFICANT CHANGE UP (ref 22–31)
CREAT SERPL-MCNC: 0.61 MG/DL — SIGNIFICANT CHANGE UP (ref 0.5–1.3)
EGFR: 86 ML/MIN/1.73M2 — SIGNIFICANT CHANGE UP
GLUCOSE BLDC GLUCOMTR-MCNC: 132 MG/DL — HIGH (ref 70–99)
GLUCOSE BLDC GLUCOMTR-MCNC: 148 MG/DL — HIGH (ref 70–99)
GLUCOSE BLDC GLUCOMTR-MCNC: 151 MG/DL — HIGH (ref 70–99)
GLUCOSE BLDC GLUCOMTR-MCNC: 161 MG/DL — HIGH (ref 70–99)
GLUCOSE SERPL-MCNC: 157 MG/DL — HIGH (ref 70–99)
HCT VFR BLD CALC: 26 % — LOW (ref 34.5–45)
HGB BLD-MCNC: 8.9 G/DL — LOW (ref 11.5–15.5)
MCHC RBC-ENTMCNC: 31.4 PG — SIGNIFICANT CHANGE UP (ref 27–34)
MCHC RBC-ENTMCNC: 34.2 GM/DL — SIGNIFICANT CHANGE UP (ref 32–36)
MCV RBC AUTO: 91.9 FL — SIGNIFICANT CHANGE UP (ref 80–100)
NRBC # BLD: 0 /100 WBCS — SIGNIFICANT CHANGE UP (ref 0–0)
NRBC # FLD: 0.06 K/UL — HIGH (ref 0–0)
PLATELET # BLD AUTO: 209 K/UL — SIGNIFICANT CHANGE UP (ref 150–400)
POTASSIUM SERPL-MCNC: 3.4 MMOL/L — LOW (ref 3.5–5.3)
POTASSIUM SERPL-SCNC: 3.4 MMOL/L — LOW (ref 3.5–5.3)
RBC # BLD: 2.83 M/UL — LOW (ref 3.8–5.2)
RBC # FLD: 13.3 % — SIGNIFICANT CHANGE UP (ref 10.3–14.5)
SODIUM SERPL-SCNC: 142 MMOL/L — SIGNIFICANT CHANGE UP (ref 135–145)
WBC # BLD: 10.2 K/UL — SIGNIFICANT CHANGE UP (ref 3.8–10.5)
WBC # FLD AUTO: 10.2 K/UL — SIGNIFICANT CHANGE UP (ref 3.8–10.5)

## 2023-09-17 RX ADMIN — AMLODIPINE BESYLATE 2.5 MILLIGRAM(S): 2.5 TABLET ORAL at 06:29

## 2023-09-17 RX ADMIN — ATORVASTATIN CALCIUM 40 MILLIGRAM(S): 80 TABLET, FILM COATED ORAL at 21:14

## 2023-09-17 RX ADMIN — Medication 81 MILLIGRAM(S): at 05:39

## 2023-09-17 RX ADMIN — Medication 81 MILLIGRAM(S): at 18:00

## 2023-09-17 RX ADMIN — Medication 975 MILLIGRAM(S): at 03:00

## 2023-09-17 RX ADMIN — Medication 100 MILLIGRAM(S): at 05:40

## 2023-09-17 RX ADMIN — Medication 1: at 11:46

## 2023-09-17 RX ADMIN — GABAPENTIN 300 MILLIGRAM(S): 400 CAPSULE ORAL at 05:38

## 2023-09-17 RX ADMIN — GABAPENTIN 300 MILLIGRAM(S): 400 CAPSULE ORAL at 18:00

## 2023-09-17 RX ADMIN — POLYETHYLENE GLYCOL 3350 17 GRAM(S): 17 POWDER, FOR SOLUTION ORAL at 11:46

## 2023-09-17 RX ADMIN — Medication 975 MILLIGRAM(S): at 18:00

## 2023-09-17 RX ADMIN — FAMOTIDINE 20 MILLIGRAM(S): 10 INJECTION INTRAVENOUS at 18:00

## 2023-09-17 RX ADMIN — SENNA PLUS 2 TABLET(S): 8.6 TABLET ORAL at 21:14

## 2023-09-17 RX ADMIN — Medication 975 MILLIGRAM(S): at 18:09

## 2023-09-17 RX ADMIN — Medication 975 MILLIGRAM(S): at 10:34

## 2023-09-17 RX ADMIN — Medication 1: at 16:35

## 2023-09-17 RX ADMIN — FAMOTIDINE 20 MILLIGRAM(S): 10 INJECTION INTRAVENOUS at 05:38

## 2023-09-17 RX ADMIN — Medication 975 MILLIGRAM(S): at 10:56

## 2023-09-17 RX ADMIN — Medication 975 MILLIGRAM(S): at 02:15

## 2023-09-17 NOTE — PROGRESS NOTE ADULT - SUBJECTIVE AND OBJECTIVE BOX
Orthopedic Surgery Progress Note     S: Patient seen and examined today. No acute events overnight. Pain is well controlled. Denies f/c, chest pain, shortness of breath, dizziness.    MEDICATIONS  (STANDING):  acetaminophen     Tablet .. 975 milliGRAM(s) Oral every 8 hours  amLODIPine   Tablet 2.5 milliGRAM(s) Oral daily  aspirin enteric coated 81 milliGRAM(s) Oral two times a day  atorvastatin 40 milliGRAM(s) Oral at bedtime  dextrose 5%. 1000 milliLiter(s) (100 mL/Hr) IV Continuous <Continuous>  dextrose 5%. 1000 milliLiter(s) (50 mL/Hr) IV Continuous <Continuous>  dextrose 50% Injectable 25 Gram(s) IV Push once  dextrose 50% Injectable 12.5 Gram(s) IV Push once  dextrose 50% Injectable 25 Gram(s) IV Push once  famotidine    Tablet 20 milliGRAM(s) Oral two times a day  gabapentin 300 milliGRAM(s) Oral two times a day  glucagon  Injectable 1 milliGRAM(s) IntraMuscular once  influenza  Vaccine (HIGH DOSE) 0.7 milliLiter(s) IntraMuscular once  insulin lispro (ADMELOG) corrective regimen sliding scale   SubCutaneous at bedtime  insulin lispro (ADMELOG) corrective regimen sliding scale   SubCutaneous three times a day before meals  metoprolol succinate  milliGRAM(s) Oral daily  polyethylene glycol 3350 17 Gram(s) Oral daily  senna 2 Tablet(s) Oral at bedtime    MEDICATIONS  (PRN):  dextrose Oral Gel 15 Gram(s) Oral once PRN Blood Glucose LESS THAN 70 milliGRAM(s)/deciliter  oxyCODONE    IR 5 milliGRAM(s) Oral every 4 hours PRN Severe Pain (7 - 10)  oxyCODONE    IR 2.5 milliGRAM(s) Oral every 4 hours PRN Moderate Pain (4 - 6)  traMADol 50 milliGRAM(s) Oral every 6 hours PRN Mild Pain (1 - 3)      Vital Signs Last 24 Hrs  T(C): 37 (16 Sep 2023 21:37), Max: 37.8 (16 Sep 2023 17:41)  T(F): 98.6 (16 Sep 2023 21:37), Max: 100 (16 Sep 2023 17:41)  HR: 88 (16 Sep 2023 21:37) (69 - 101)  BP: 154/61 (16 Sep 2023 21:37) (112/41 - 156/61)  BP(mean): --  RR: 18 (16 Sep 2023 21:37) (16 - 18)  SpO2: 96% (16 Sep 2023 21:37) (94% - 96%)    Parameters below as of 16 Sep 2023 21:37  Patient On (Oxygen Delivery Method): room air        09-15-23 @ 07:01  -  09-16-23 @ 07:00  --------------------------------------------------------  IN: 720 mL / OUT: 1700 mL / NET: -980 mL        Physical Exam:  Gen: NAD  L Lower Extremity:  Incision c/d/i  SILT S/S/DP/SP/T  +EHL/FHL/TA/GSC  Compartments soft/non-tender  Toes warm, Cap refill brisk/warm and perfused, +DP/PT pulse         LABS:                        9.1    9.78  )-----------( 171      ( 15 Sep 2023 05:29 )             27.7     09-15    137  |  100  |  13  ----------------------------<  203<H>  4.3   |  23  |  0.73    Ca    8.3<L>      15 Sep 2023 05:29  Phos  3.7     09-15  Mg     1.80     09-15     Orthopedic Surgery Progress Note     S: Patient seen and examined today. No acute events overnight. Pain is well controlled. Denies f/c, chest pain, shortness of breath, dizziness.    MEDICATIONS  (STANDING):  acetaminophen     Tablet .. 975 milliGRAM(s) Oral every 8 hours  amLODIPine   Tablet 2.5 milliGRAM(s) Oral daily  aspirin enteric coated 81 milliGRAM(s) Oral two times a day  atorvastatin 40 milliGRAM(s) Oral at bedtime  dextrose 5%. 1000 milliLiter(s) (100 mL/Hr) IV Continuous <Continuous>  dextrose 5%. 1000 milliLiter(s) (50 mL/Hr) IV Continuous <Continuous>  dextrose 50% Injectable 25 Gram(s) IV Push once  dextrose 50% Injectable 12.5 Gram(s) IV Push once  dextrose 50% Injectable 25 Gram(s) IV Push once  famotidine    Tablet 20 milliGRAM(s) Oral two times a day  gabapentin 300 milliGRAM(s) Oral two times a day  glucagon  Injectable 1 milliGRAM(s) IntraMuscular once  influenza  Vaccine (HIGH DOSE) 0.7 milliLiter(s) IntraMuscular once  insulin lispro (ADMELOG) corrective regimen sliding scale   SubCutaneous at bedtime  insulin lispro (ADMELOG) corrective regimen sliding scale   SubCutaneous three times a day before meals  metoprolol succinate  milliGRAM(s) Oral daily  polyethylene glycol 3350 17 Gram(s) Oral daily  senna 2 Tablet(s) Oral at bedtime    MEDICATIONS  (PRN):  dextrose Oral Gel 15 Gram(s) Oral once PRN Blood Glucose LESS THAN 70 milliGRAM(s)/deciliter  oxyCODONE    IR 5 milliGRAM(s) Oral every 4 hours PRN Severe Pain (7 - 10)  oxyCODONE    IR 2.5 milliGRAM(s) Oral every 4 hours PRN Moderate Pain (4 - 6)  traMADol 50 milliGRAM(s) Oral every 6 hours PRN Mild Pain (1 - 3)      Vital Signs Last 24 Hrs  T(C): 37 (16 Sep 2023 21:37), Max: 37.8 (16 Sep 2023 17:41)  T(F): 98.6 (16 Sep 2023 21:37), Max: 100 (16 Sep 2023 17:41)  HR: 88 (16 Sep 2023 21:37) (69 - 101)  BP: 154/61 (16 Sep 2023 21:37) (112/41 - 156/61)  BP(mean): --  RR: 18 (16 Sep 2023 21:37) (16 - 18)  SpO2: 96% (16 Sep 2023 21:37) (94% - 96%)    Parameters below as of 16 Sep 2023 21:37  Patient On (Oxygen Delivery Method): room air        09-15-23 @ 07:01  -  09-16-23 @ 07:00  --------------------------------------------------------  IN: 720 mL / OUT: 1700 mL / NET: -980 mL        Physical Exam:  Gen: NAD  L Lower Extremity:  Incision c/d/i  SILT S/S/DP/SP/T  +EHL/FHL/TA/GSC  Compartments soft/non-tender  Toes warm, Cap refill brisk/warm and perfused, +DP/PT pulse         LABS:                        9.1    9.78  )-----------( 171      ( 15 Sep 2023 05:29 )             27.7     09-15    137  |  100  |  13  ----------------------------<  203<H>  4.3   |  23  |  0.73    Ca    8.3<L>      15 Sep 2023 05:29  Phos  3.7     09-15  Mg     1.80     09-15    87F POD2 s/p L IMN, doing well.    - Pain control  - WBAT  - PT/OT  - DVT ppx ASA 81 BID  - FU AM labs  - Dispo planning to home per family request, needs PT clearance

## 2023-09-18 VITALS
OXYGEN SATURATION: 99 % | TEMPERATURE: 98 F | SYSTOLIC BLOOD PRESSURE: 142 MMHG | HEART RATE: 78 BPM | RESPIRATION RATE: 16 BRPM | DIASTOLIC BLOOD PRESSURE: 57 MMHG

## 2023-09-18 DIAGNOSIS — S72.002A FRACTURE OF UNSPECIFIED PART OF NECK OF LEFT FEMUR, INITIAL ENCOUNTER FOR CLOSED FRACTURE: ICD-10-CM

## 2023-09-18 LAB
ANION GAP SERPL CALC-SCNC: 9 MMOL/L — SIGNIFICANT CHANGE UP (ref 7–14)
BUN SERPL-MCNC: 14 MG/DL — SIGNIFICANT CHANGE UP (ref 7–23)
CALCIUM SERPL-MCNC: 9 MG/DL — SIGNIFICANT CHANGE UP (ref 8.4–10.5)
CHLORIDE SERPL-SCNC: 106 MMOL/L — SIGNIFICANT CHANGE UP (ref 98–107)
CO2 SERPL-SCNC: 26 MMOL/L — SIGNIFICANT CHANGE UP (ref 22–31)
CREAT SERPL-MCNC: 0.72 MG/DL — SIGNIFICANT CHANGE UP (ref 0.5–1.3)
EGFR: 81 ML/MIN/1.73M2 — SIGNIFICANT CHANGE UP
GLUCOSE BLDC GLUCOMTR-MCNC: 191 MG/DL — HIGH (ref 70–99)
GLUCOSE BLDC GLUCOMTR-MCNC: 215 MG/DL — HIGH (ref 70–99)
GLUCOSE SERPL-MCNC: 125 MG/DL — HIGH (ref 70–99)
HCT VFR BLD CALC: 27.6 % — LOW (ref 34.5–45)
HGB BLD-MCNC: 9.2 G/DL — LOW (ref 11.5–15.5)
MCHC RBC-ENTMCNC: 31.5 PG — SIGNIFICANT CHANGE UP (ref 27–34)
MCHC RBC-ENTMCNC: 33.3 GM/DL — SIGNIFICANT CHANGE UP (ref 32–36)
MCV RBC AUTO: 94.5 FL — SIGNIFICANT CHANGE UP (ref 80–100)
NRBC # BLD: 0 /100 WBCS — SIGNIFICANT CHANGE UP (ref 0–0)
NRBC # FLD: 0.05 K/UL — HIGH (ref 0–0)
PLATELET # BLD AUTO: 245 K/UL — SIGNIFICANT CHANGE UP (ref 150–400)
POTASSIUM SERPL-MCNC: 3.7 MMOL/L — SIGNIFICANT CHANGE UP (ref 3.5–5.3)
POTASSIUM SERPL-SCNC: 3.7 MMOL/L — SIGNIFICANT CHANGE UP (ref 3.5–5.3)
RBC # BLD: 2.92 M/UL — LOW (ref 3.8–5.2)
RBC # FLD: 13.5 % — SIGNIFICANT CHANGE UP (ref 10.3–14.5)
SODIUM SERPL-SCNC: 141 MMOL/L — SIGNIFICANT CHANGE UP (ref 135–145)
WBC # BLD: 8.07 K/UL — SIGNIFICANT CHANGE UP (ref 3.8–10.5)
WBC # FLD AUTO: 8.07 K/UL — SIGNIFICANT CHANGE UP (ref 3.8–10.5)

## 2023-09-18 PROCEDURE — 99233 SBSQ HOSP IP/OBS HIGH 50: CPT

## 2023-09-18 RX ORDER — ASPIRIN/CALCIUM CARB/MAGNESIUM 324 MG
0 TABLET ORAL
Qty: 0 | Refills: 0 | DISCHARGE

## 2023-09-18 RX ORDER — METFORMIN HYDROCHLORIDE 850 MG/1
1 TABLET ORAL
Qty: 0 | Refills: 0 | DISCHARGE

## 2023-09-18 RX ORDER — LORATADINE 10 MG/1
1 TABLET ORAL
Qty: 0 | Refills: 0 | DISCHARGE

## 2023-09-18 RX ORDER — PSYLLIUM SEED (WITH DEXTROSE)
0 POWDER (GRAM) ORAL
Qty: 0 | Refills: 0 | DISCHARGE

## 2023-09-18 RX ORDER — ASPIRIN/CALCIUM CARB/MAGNESIUM 324 MG
1 TABLET ORAL
Qty: 80 | Refills: 0
Start: 2023-09-18 | End: 2023-10-27

## 2023-09-18 RX ORDER — OMEGA-3 ACID ETHYL ESTERS 1 G
1 CAPSULE ORAL
Qty: 0 | Refills: 0 | DISCHARGE

## 2023-09-18 RX ORDER — GABAPENTIN 400 MG/1
0 CAPSULE ORAL
Qty: 0 | Refills: 0 | DISCHARGE

## 2023-09-18 RX ORDER — AMLODIPINE BESYLATE 2.5 MG/1
1 TABLET ORAL
Qty: 0 | Refills: 0 | DISCHARGE

## 2023-09-18 RX ORDER — CALCIUM CARBONATE 500(1250)
1 TABLET ORAL
Qty: 0 | Refills: 0 | DISCHARGE

## 2023-09-18 RX ORDER — SENNA PLUS 8.6 MG/1
2 TABLET ORAL
Qty: 0 | Refills: 0 | DISCHARGE
Start: 2023-09-18

## 2023-09-18 RX ORDER — AZITHROMYCIN 500 MG/1
0 TABLET, FILM COATED ORAL
Qty: 0 | Refills: 0 | DISCHARGE

## 2023-09-18 RX ORDER — ASPIRIN/CALCIUM CARB/MAGNESIUM 324 MG
1 TABLET ORAL
Refills: 0 | DISCHARGE

## 2023-09-18 RX ORDER — ACETAMINOPHEN 500 MG
3 TABLET ORAL
Qty: 0 | Refills: 0 | DISCHARGE
Start: 2023-09-18

## 2023-09-18 RX ORDER — METOPROLOL TARTRATE 50 MG
1 TABLET ORAL
Qty: 0 | Refills: 0 | DISCHARGE

## 2023-09-18 RX ORDER — LIDOCAINE 4 G/100G
0 CREAM TOPICAL
Qty: 0 | Refills: 0 | DISCHARGE

## 2023-09-18 RX ORDER — POLYETHYLENE GLYCOL 3350 17 G/17G
17 POWDER, FOR SOLUTION ORAL
Qty: 0 | Refills: 0 | DISCHARGE
Start: 2023-09-18

## 2023-09-18 RX ORDER — ALENDRONATE SODIUM 70 MG/1
1 TABLET ORAL
Qty: 0 | Refills: 0 | DISCHARGE

## 2023-09-18 RX ORDER — HYDROCORTISONE 1 %
1 OINTMENT (GRAM) TOPICAL
Qty: 0 | Refills: 0 | DISCHARGE

## 2023-09-18 RX ORDER — FLUTICASONE PROPIONATE 50 MCG
50 SPRAY, SUSPENSION NASAL
Qty: 0 | Refills: 0 | DISCHARGE

## 2023-09-18 RX ORDER — ACETAMINOPHEN 500 MG
1 TABLET ORAL
Qty: 0 | Refills: 0 | DISCHARGE

## 2023-09-18 RX ORDER — OXYCODONE HYDROCHLORIDE 5 MG/1
1 TABLET ORAL
Qty: 20 | Refills: 0
Start: 2023-09-18 | End: 2023-09-22

## 2023-09-18 RX ORDER — ALBUTEROL 90 UG/1
3 AEROSOL, METERED ORAL
Qty: 0 | Refills: 0 | DISCHARGE

## 2023-09-18 RX ORDER — SENNOSIDES/DOCUSATE SODIUM 8.6MG-50MG
1 TABLET ORAL
Qty: 0 | Refills: 0 | DISCHARGE

## 2023-09-18 RX ORDER — SIMVASTATIN 20 MG/1
1 TABLET, FILM COATED ORAL
Qty: 0 | Refills: 0 | DISCHARGE

## 2023-09-18 RX ORDER — FAMOTIDINE 10 MG/ML
5 INJECTION INTRAVENOUS
Qty: 0 | Refills: 0 | DISCHARGE

## 2023-09-18 RX ADMIN — Medication 975 MILLIGRAM(S): at 10:53

## 2023-09-18 RX ADMIN — Medication 975 MILLIGRAM(S): at 02:05

## 2023-09-18 RX ADMIN — Medication 100 MILLIGRAM(S): at 05:34

## 2023-09-18 RX ADMIN — Medication 1: at 07:28

## 2023-09-18 RX ADMIN — Medication 2: at 12:04

## 2023-09-18 RX ADMIN — FAMOTIDINE 20 MILLIGRAM(S): 10 INJECTION INTRAVENOUS at 05:33

## 2023-09-18 RX ADMIN — AMLODIPINE BESYLATE 2.5 MILLIGRAM(S): 2.5 TABLET ORAL at 05:33

## 2023-09-18 RX ADMIN — Medication 81 MILLIGRAM(S): at 05:34

## 2023-09-18 RX ADMIN — GABAPENTIN 300 MILLIGRAM(S): 400 CAPSULE ORAL at 05:34

## 2023-09-18 RX ADMIN — Medication 975 MILLIGRAM(S): at 10:14

## 2023-09-18 RX ADMIN — Medication 975 MILLIGRAM(S): at 03:00

## 2023-09-18 NOTE — PROGRESS NOTE ADULT - SUBJECTIVE AND OBJECTIVE BOX
Yoon Worley MD  [facility] Division of Hospital Medicine  Pager [phone] (M-F 8AM-5PM)  Other Times: [phone]    Patient is a 87y old  Female who presents with a chief complaint of L hip intertrochanteric fracture (18 Sep 2023 06:36)    SUBJECTIVE / OVERNIGHT EVENTS: no acute events overnight     MEDICATIONS  (STANDING):  acetaminophen     Tablet .. 975 milliGRAM(s) Oral every 8 hours  amLODIPine   Tablet 2.5 milliGRAM(s) Oral daily  aspirin enteric coated 81 milliGRAM(s) Oral two times a day  atorvastatin 40 milliGRAM(s) Oral at bedtime  dextrose 5%. 1000 milliLiter(s) (100 mL/Hr) IV Continuous <Continuous>  dextrose 5%. 1000 milliLiter(s) (50 mL/Hr) IV Continuous <Continuous>  dextrose 50% Injectable 25 Gram(s) IV Push once  dextrose 50% Injectable 12.5 Gram(s) IV Push once  dextrose 50% Injectable 25 Gram(s) IV Push once  famotidine    Tablet 20 milliGRAM(s) Oral two times a day  gabapentin 300 milliGRAM(s) Oral two times a day  glucagon  Injectable 1 milliGRAM(s) IntraMuscular once  influenza  Vaccine (HIGH DOSE) 0.7 milliLiter(s) IntraMuscular once  insulin lispro (ADMELOG) corrective regimen sliding scale   SubCutaneous at bedtime  insulin lispro (ADMELOG) corrective regimen sliding scale   SubCutaneous three times a day before meals  metoprolol succinate  milliGRAM(s) Oral daily  polyethylene glycol 3350 17 Gram(s) Oral daily  senna 2 Tablet(s) Oral at bedtime    MEDICATIONS  (PRN):  dextrose Oral Gel 15 Gram(s) Oral once PRN Blood Glucose LESS THAN 70 milliGRAM(s)/deciliter  oxyCODONE    IR 2.5 milliGRAM(s) Oral every 4 hours PRN Moderate Pain (4 - 6)  oxyCODONE    IR 5 milliGRAM(s) Oral every 4 hours PRN Severe Pain (7 - 10)  traMADol 50 milliGRAM(s) Oral every 6 hours PRN Mild Pain (1 - 3)      PHYSICAL EXAM:  Vital Signs Last 24 Hrs  T(C): 36.7 (18 Sep 2023 10:11), Max: 37 (17 Sep 2023 18:55)  T(F): 98.1 (18 Sep 2023 10:11), Max: 98.6 (17 Sep 2023 18:55)  HR: 70 (18 Sep 2023 10:11) (70 - 87)  BP: 122/52 (18 Sep 2023 10:11) (122/52 - 165/64)  BP(mean): 80 (18 Sep 2023 05:40) (80 - 84)  RR: 16 (18 Sep 2023 10:11) (16 - 18)  SpO2: 98% (18 Sep 2023 10:11) (98% - 100%)    Parameters below as of 18 Sep 2023 10:11  Patient On (Oxygen Delivery Method): room air      CONSTITUTIONAL: NAD  ENMT: Moist oral mucosa, no pharyngeal injection or exudates;   RESPIRATORY: Normal respiratory effort; lungs are clear to auscultation bilaterally  CARDIOVASCULAR: Regular rate and rhythm, normal S1 and S2, ; No lower extremity edema; Left hip dressing   ABDOMEN: Nontender to palpation, normoactive bowel sounds, no rebound/guarding;   PSYCH: A+O to person, place, and time; affect appropriate  NEUROLOGY: CN 2-12 are intact and symmetric; no gross sensory deficits   SKIN: No rashes; no palpable lesions    LABS:                        9.2    8.07  )-----------( 245      ( 18 Sep 2023 05:22 )             27.6     09-18    141  |  106  |  14  ----------------------------<  125<H>  3.7   |  26  |  0.72    Ca    9.0      18 Sep 2023 05:22            Urinalysis Basic - ( 18 Sep 2023 05:22 )    Color: x / Appearance: x / SG: x / pH: x  Gluc: 125 mg/dL / Ketone: x  / Bili: x / Urobili: x   Blood: x / Protein: x / Nitrite: x   Leuk Esterase: x / RBC: x / WBC x   Sq Epi: x / Non Sq Epi: x / Bacteria: x          RADIOLOGY & ADDITIONAL TESTS:  Results Reviewed:   Imaging Personally Reviewed:  Electrocardiogram Personally Reviewed:    COORDINATION OF CARE:  Care Discussed with Consultants/Other Providers [Y/N]:  Prior or Outpatient Records Reviewed [Y/N]:

## 2023-09-18 NOTE — PROGRESS NOTE ADULT - PROBLEM SELECTOR PLAN 3
A1C 5.8%  - hold oral agents while admitted  - FS/SSI qac and hs
at home on metformin and gabapentin   Hold Metformin   Check HB A1C   Moderate ISS for now, goal 140-180.
shortness of breath

## 2023-09-18 NOTE — PROGRESS NOTE ADULT - PROBLEM SELECTOR PLAN 2
Hb stable  - post op changes  - trend CBC
At home on Amlodipine and metoprolol   restart with holding parameters.

## 2023-09-18 NOTE — PROGRESS NOTE ADULT - REASON FOR ADMISSION
L hip intertrochanteric fracture

## 2023-09-18 NOTE — PROGRESS NOTE ADULT - ASSESSMENT
87F POD2 s/p L IMN, doing well.    - Pain control  - WBAT  - PT/OT  - DVT ppx ASA 81 BID  - FU AM labs  - Dispo planning to home per family request, needs PT clearance 
88 y/o F with PMH of DM, HTN, HLD presents for left hip pain s/p fall yesterday evening. At around 7pm yesterday the patient was trying to sit down on an outdoor swing when she missed the swing and fell backwards to the ground. She hit the left side of the back of her head to the concrete floor as well as her left shoulder and hip. She usually walks with a cane or walker at her baseline but she was unable to stand or walk in any capacity after the fall. Her family assisted her into her bed hoping she would feel better as the evening progressed but instead they noticed that her left hip was looking more swollen and she was still complaining of significant pain prompting current visit. Denies any other complaints or concerns. Denies LOC, chest pain, SOB, cough, fevers, chills, abdominal pain, N/V/D/C, urinary complaints, HA, dizziness, numbness, tingling, weakness, sick contacts, recent travel.  
87F with hx of HTN, HLD, T2DM who presents with left hip pain s/p fall, found to have acute impacted left femoral intertrochanteric fracture s/p L hip IMN (OR 9/14).

## 2023-09-18 NOTE — PROGRESS NOTE ADULT - SUBJECTIVE AND OBJECTIVE BOX
Orthopedic Surgery Progress Note     S: Patient seen and examined today. No acute events overnight, pain is well controlled. Daughter at bedside. Ambulated total of 55 ft yesterday w/ rolling walker.    Vital Signs Last 24 Hrs  T(C): 36.8 (18 Sep 2023 05:40), Max: 37.4 (17 Sep 2023 10:30)  T(F): 98.2 (18 Sep 2023 05:40), Max: 99.3 (17 Sep 2023 10:30)  HR: 80 (18 Sep 2023 05:40) (79 - 87)  BP: 158/58 (18 Sep 2023 05:40) (136/55 - 165/64)  BP(mean): 80 (18 Sep 2023 05:40) (80 - 84)  RR: 18 (18 Sep 2023 05:40) (17 - 18)  SpO2: 100% (18 Sep 2023 05:40) (98% - 100%)  Parameters below as of 18 Sep 2023 05:40  Patient On (Oxygen Delivery Method): room air    Physical Exam:  Gen: NAD  LLE:  Distal dressing mildly stained, changed --> now c/d/i; other dressings c/d/i  SILT Ben/Saph/DP/SP/T  +EHL/FHL/TA/GSC  Compartments soft/non-tender  +DP pulse, WWP      LABS:                                   8.9    10.20 )-----------( 209      ( 17 Sep 2023 06:11 )             26.0   09-17    142  |  105  |  10  ----------------------------<  157<H>  3.4<L>   |  27  |  0.61    Ca    8.7      17 Sep 2023 06:11      87F s/p L hip IMN on 9/14/23, recovering appropriately.  - Pain control  - WBAT LLE  - PT/OT  - DVT ppx: ASA 81 BID  - Dispo: home per family request, needs PT clearance

## 2023-09-18 NOTE — PROGRESS NOTE ADULT - PROBLEM SELECTOR PLAN 5
-likely secondary to post-op changes; pt hemodynamically stable; will monitor CBC in am
chronic  - c/w atorvastatin   - would dc patient on atorvastatin and not simvastatin (given interaction of simvastatin with amlodipine)

## 2023-09-18 NOTE — PROGRESS NOTE ADULT - PROBLEM SELECTOR PLAN 1
post-op from a L femoral IMN  PT  Pain control  DVT px per Ortho
left hip pain s/p fall, found to have acute impacted left femoral intertrochanteric fracture s/p L hip IMN (OR 9/14)  - post op care per ortho  - pain control: Tylenol, low dose PRN oxycodone given age, bowel regimen with opiates   - encouraged incentive spirometer use  - PT recommended home  - DVT ppx: aspirin 81mg BID

## 2023-09-18 NOTE — PROGRESS NOTE ADULT - PROBLEM SELECTOR PLAN 4
[facility] lipid panel in am   C/w simvastatin
BP stable  - c/w amlodipine, metoprolol  - monitor vital signs

## 2023-09-27 ENCOUNTER — APPOINTMENT (OUTPATIENT)
Dept: ORTHOPEDIC SURGERY | Facility: CLINIC | Age: 88
End: 2023-09-27
Payer: MEDICAID

## 2023-09-27 DIAGNOSIS — Z98.890 OTHER SPECIFIED POSTPROCEDURAL STATES: ICD-10-CM

## 2023-09-27 DIAGNOSIS — Z87.81 OTHER SPECIFIED POSTPROCEDURAL STATES: ICD-10-CM

## 2023-09-27 PROBLEM — Z00.00 ENCOUNTER FOR PREVENTIVE HEALTH EXAMINATION: Status: ACTIVE | Noted: 2023-09-27

## 2023-09-27 PROCEDURE — 73502 X-RAY EXAM HIP UNI 2-3 VIEWS: CPT

## 2023-09-27 PROCEDURE — 99024 POSTOP FOLLOW-UP VISIT: CPT

## 2023-10-25 ENCOUNTER — APPOINTMENT (OUTPATIENT)
Dept: ORTHOPEDIC SURGERY | Facility: CLINIC | Age: 88
End: 2023-10-25

## 2023-10-30 ENCOUNTER — APPOINTMENT (OUTPATIENT)
Dept: ORTHOPEDIC SURGERY | Facility: CLINIC | Age: 88
End: 2023-10-30
Payer: MEDICAID

## 2023-10-30 DIAGNOSIS — S72.142A DISPLACED INTERTROCHANTERIC FRACTURE OF LEFT FEMUR, INITIAL ENCOUNTER FOR CLOSED FRACTURE: ICD-10-CM

## 2023-10-30 PROCEDURE — 73502 X-RAY EXAM HIP UNI 2-3 VIEWS: CPT

## 2023-10-30 PROCEDURE — 99024 POSTOP FOLLOW-UP VISIT: CPT

## 2023-11-01 PROBLEM — S72.142A CLOSED DISPLACED INTERTROCHANTERIC FRACTURE OF LEFT FEMUR, INITIAL ENCOUNTER: Status: ACTIVE | Noted: 2023-09-27

## 2024-05-03 ENCOUNTER — APPOINTMENT (OUTPATIENT)
Dept: ORTHOPEDIC SURGERY | Facility: CLINIC | Age: 89
End: 2024-05-03

## 2024-10-11 NOTE — PATIENT PROFILE ADULT - HAVE YOU BEEN EATING POORLY BECAUSE OF A DECREASED APPETITE?
What Type Of Note Output Would You Prefer (Optional)?: Standard Output Hpi Title: Evaluation of Skin Lesions How Severe Are Your Spot(S)?: mild Have Your Spot(S) Been Treated In The Past?: has not been treated No (0)

## 2024-10-28 NOTE — ED CLERICAL - CLERICAL COMMENTS
Assigned [id number]. Neg per [place] [employer] [No] : No [1 or 2 (0 pts)] : 1 or 2 (0 points) [Never (0 pts)] : Never (0 points) [No falls in past year] : Patient reported no falls in the past year [0] : 2) Feeling down, depressed, or hopeless: Not at all (0) [PHQ-2 Negative - No further assessment needed] : PHQ-2 Negative - No further assessment needed [Patient/Caregiver not ready to engage] : , patient/caregiver not ready to engage [I will adhere to the patient's wishes.] : I will adhere to the patient's wishes. [Time Spent: ___ minutes] : Time Spent: [unfilled] minutes [Current] : Current [0-4] : 0-4

## (undated) DEVICE — ELCTR GROUNDING PAD ADULT COVIDIEN

## (undated) DEVICE — DRSG COBAN 4"

## (undated) DEVICE — PACK LIJ BASIC ORTHO

## (undated) DEVICE — VENODYNE/SCD SLEEVE CALF MEDIUM

## (undated) DEVICE — SOL IRR POUR H2O 1500ML

## (undated) DEVICE — SUT VICRYL 1 36" CTX UNDYED

## (undated) DEVICE — SOL IRR BAG NS 0.9% 3000ML

## (undated) DEVICE — TRAP SPECIMEN SPUTUM 40CC

## (undated) DEVICE — DRAPE SHOWER CURTAIN ISOLATION

## (undated) DEVICE — GUIDEWIRE SYNTHES 3.2MM X 400MM

## (undated) DEVICE — DRAPE U POLY BLUE 60"X60"

## (undated) DEVICE — LIJ-CONSIGN SYNTHES TITANIUM TFN LOCKING SET: Type: DURABLE MEDICAL EQUIPMENT

## (undated) DEVICE — LABELS BLANK W PEN

## (undated) DEVICE — SUT VICRYL 2-0 27" CP-1 UNDYED

## (undated) DEVICE — LAP PAD W RING 18 X 18"

## (undated) DEVICE — DRAPE COVER SNAP 36X30"

## (undated) DEVICE — CANISTER DISPOSABLE THIN WALL 3000CC

## (undated) DEVICE — PREP CHLORAPREP HI-LITE ORANGE 26ML

## (undated) DEVICE — CONNECTOR 5 IN1 SUCTION TUBING

## (undated) DEVICE — DRSG TAPE MICROFOAM 4"

## (undated) DEVICE — ELCTR BOVIE PENCIL SMOKE EVACUATION

## (undated) DEVICE — DRSG WEBRIL 4"

## (undated) DEVICE — SUT VICRYL 2-0 27" FS-1 UNDYED

## (undated) DEVICE — STAPLER SKIN MULTI DIRECTION W35

## (undated) DEVICE — DRSG ADAPTIC 3 X 3"

## (undated) DEVICE — LIJ-CONSIGN SYNTHES TFN PERCUTANEOUS & LAG SCREW INST: Type: DURABLE MEDICAL EQUIPMENT

## (undated) DEVICE — POSITIONER STRAP ARMBOARD VELCRO TS-30

## (undated) DEVICE — DRILL BIT SYNTHES ORTHO CALIBRATED 4.2MM X 330MM

## (undated) DEVICE — SUT VICRYL 0 27" OS-6 UNDYED